# Patient Record
Sex: FEMALE | Race: WHITE | NOT HISPANIC OR LATINO | Employment: PART TIME | URBAN - METROPOLITAN AREA
[De-identification: names, ages, dates, MRNs, and addresses within clinical notes are randomized per-mention and may not be internally consistent; named-entity substitution may affect disease eponyms.]

---

## 2017-07-11 ENCOUNTER — OFFICE VISIT (OUTPATIENT)
Dept: FAMILY MEDICINE | Facility: CLINIC | Age: 32
End: 2017-07-11
Payer: COMMERCIAL

## 2017-07-11 VITALS
BODY MASS INDEX: 22.98 KG/M2 | WEIGHT: 143 LBS | OXYGEN SATURATION: 99 % | HEART RATE: 89 BPM | DIASTOLIC BLOOD PRESSURE: 81 MMHG | TEMPERATURE: 100.2 F | SYSTOLIC BLOOD PRESSURE: 133 MMHG | HEIGHT: 66 IN

## 2017-07-11 DIAGNOSIS — L23.7 CONTACT DERMATITIS DUE TO POISON IVY: Primary | ICD-10-CM

## 2017-07-11 PROCEDURE — 99213 OFFICE O/P EST LOW 20 MIN: CPT | Performed by: FAMILY MEDICINE

## 2017-07-11 RX ORDER — METHYLPREDNISOLONE 4 MG
TABLET, DOSE PACK ORAL
Qty: 21 TABLET | Refills: 0 | Status: SHIPPED | OUTPATIENT
Start: 2017-07-11 | End: 2017-07-26

## 2017-07-11 NOTE — PATIENT INSTRUCTIONS
[facility]    If you have any questions regarding to your visit please contact your care team:       Team Red:   Clinic Hours Telephone Number   Dr. Renetta Alonzo, NP   7am-7pm  Monday - Thursday   7am-5pm  Fridays  [phone]  (Appointment scheduling available 24/7)    Questions about your visit?   Team Line  [phone]   [facility] [place] [facility] 11am-9pm Monday-Friday Saturday-Sunday- 9am-5pm   [place] - 5pm-9pm Monday-Friday Saturday-Sunday- 9am-5pm  [phone] - [place]   [phone] - [place]       What options do I have for visits at the clinic other than the traditional office visit?  To expand how we care for you, many of our providers are utilizing electronic visits (e-visits) and telephone visits, when medically appropriate, for interactions with their patients rather than a visit in the clinic.   We also offer nurse visits for many medical concerns. Just like any other service, we will bill your insurance company for this type of visit based on time spent on the phone with your provider. Not all insurance companies cover these visits. Please check with your medical insurance if this type of visit is covered. You will be responsible for any charges that are not paid by your insurance.      E-visits via MyStar[employer]ograph:  generally incur a $35.00 fee.  Telephone visits:  Time spent on the phone: *charged based on time that is spent on the phone in increments of 10 minutes. Estimated cost:   5-10 mins $30.00   11-20 mins. $59.00   21-30 mins. $85.00     Use [employer] (secure email communication and access to your chart) to send your primary care provider a message or make an appointment. Ask someone on your Team how to sign up for MyStar[employer]ograph.  For a Price Quote for your services, please call our Consumer Price Line at [phone].      As always, Thank you for trusting us with your health care needs!    Avoiding  Poison Ivy, Poison Oak, and Poison Sumac  Poison oak, poison ivy, and poison sumac are plants that can cause skin rashes. The problem is urushiol, a sap oil contained in these plants. If you're allergic to urushiol, touching 1 of these plants may cause your skin to react. Within minutes or days, you may have a red, swollen, itchy rash. You can't stop the rash. But you can soothe the itching.        Recognizing these plants  You can help prevent a poison oak, poison ivy, or poison sumac rash. Know what these plants look like. And then avoid them. They grow in the form of belinda, small plants, and large bushes. In most cases, poison oak and poison ivy have 3 leaves per stem. Poison sumac has from 7 leaves to 13 leaves per stem. Watch out for these plants when you go to any outdoor area, from a friend's overgrown back yard to the [facility]. Urushiol is present in these plants all year round, even when the leaves are gone. So always be on the lookout.  What causes a reaction?  Poison oak, poison ivy, and poison sumac thrive mainly in unmaintained outdoor areas. If you touch these plants, you may get a rash. You may also react if you touch something that came in contact with urushiol, such as a dog or cat, clothing, or equipment. But the rash caused by these plants is not contagious.  Steps to prevention  When heading outdoors, take these preventive steps:    Avoid touching any of these plants.    Wear long pants and a long-sleeved shirt.    If you're going to a heavily wooded or brushy area, also put on gloves, a hat, and boots.    Apply bentoquatam 5% to all exposed areas of skin. This creates a layer of protection between your skin and any sap oil you may touch.    If you come in contact with these plants or the oil, wash with soap and water as soon as possible.  Date Last Reviewed: 5/11/2015 2000-2017 [employer]. [address] [place] [address], [place] [address]. All rights reserved. This information is not  intended as a substitute for professional medical care. Always follow your healthcare professional's instructions.        Managing a Poison Ivy, Poison Oak, or Poison Sumac Reaction  If you come in contact with urushiol    If you think you may have come in contact with the sap oil contained in these poison ivy, poison oak, and poison sumac plants (urishol), wash the affected part of your skin. Do this within 15 minutes after contact, using water or preferably, soap and water.  Undress, and wash your clothes and gear as soon as you can. Be sure to wash any pet that was with you. Taking these steps can help prevent spreading sap oil to someone else. If you have a rash, but are not sure if it is from one of these plants, consult with your health care provider.  To soothe the itching  Your skin may react to poison oak, poison ivy, and poison sumac within minutes to a few days after contact. Once you have come into contact with these plants, you can t stop the reaction. But you can take these steps to soothe the itching:    Don t scratch or scrub your rash, even if the itching is severe. Scratching can lead to infection.    Bathe in lukewarm (not hot) water or take short cool showers to relieve the itching. For a more soothing bath, add oatmeal to the water.    Use antihistamines that are taken by mouth (such as diphenhydramine). You can buy these at the drugstore. Talk to your health care provider or pharmacist for more information on oral antihistamines.    Use over-the-counter treatments on your skin, such as cortisone, compresses of Burow s solution, and calamine lotion.  How your skin may react  A mild rash may become red, swollen, and itchy. The rash may form a line on your skin where you brushed against the plant. If you have a severe rash, your itching may worsen. And your rash may blister and ooze. If this happens, seek medical care. The fluid from your blisters will not make your rash spread. With or without  medical care, your rash may last up to 3 weeks. In the future, try to avoid coming in contact with these plants.  When to call your health care provider  Call your health care provider if:    Your rash is severe    The rash spreads beyond the exposed part of your body or affects your face.    The rash does not clear up within a few weeks  You may be given medicine to take by mouth or apply directly on the skin. Go to the emergency room if you have any trouble breathing or swallowing or have any significant swelling.  Date Last Reviewed: 5/11/2015 2000-2017 [employer]. [address] [place] [address]. All rights reserved. This information is not intended as a substitute for professional medical care. Always follow your healthcare professional's instructions.      Discharged by Anastasia Dickinson MA.

## 2017-07-11 NOTE — MR AVS SNAPSHOT
After Visit Summary   7/11/2017    Joselyn Garcias    MRN: [id number]           Patient Information     Date Of Birth          1985        Visit Information        Provider Department      7/11/2017 12:50 PM Trinidad Jhaveri MD [facility]        Today's Diagnoses     Contact dermatitis due to poison ivy    -  1      Care Instructions    [facility]    If you have any questions regarding to your visit please contact your care team:       Team Red:   Clinic Hours Telephone Number   Dr. Renetta Alonzo NP   7am-7pm  Monday - Thursday   7am-5pm  Fridays  [phone]  (Appointment scheduling available 24/7)    Questions about your visit?   Team Line  [phone]   Urgent Care - [place] and [facility] - 11am-9pm Monday-Friday Saturday-Sunday- 9am-5pm   [place] - 5pm-9pm Monday-Friday Saturday-Sunday- 9am-5pm  [phone] - [facility]  [phone] - [place]       What options do I have for visits at the clinic other than the traditional office visit?  To expand how we care for you, many of our providers are utilizing electronic visits (e-visits) and telephone visits, when medically appropriate, for interactions with their patients rather than a visit in the clinic.   We also offer nurse visits for many medical concerns. Just like any other service, we will bill your insurance company for this type of visit based on time spent on the phone with your provider. Not all insurance companies cover these visits. Please check with your medical insurance if this type of visit is covered. You will be responsible for any charges that are not paid by your insurance.      E-visits via St[employer]Pool:  generally incur a $35.00 fee.  Telephone visits:  Time spent on the phone: *charged based on time that is spent on the phone in increments of 10 minutes. Estimated cost:   5-10 mins $30.00   11-20 mins. $59.00   21-30 mins. $85.00      Use [employer] (secure email communication and access to your chart) to send your primary care provider a message or make an appointment. Ask someone on your Team how to sign up for [employer].  For a Price Quote for your services, please call our [employer] Price Line at [phone].      As always, Thank you for trusting us with your health care needs!    Avoiding Poison Ivy, Poison Oak, and Poison Sumac  Poison oak, poison ivy, and poison sumac are plants that can cause skin rashes. The problem is urushiol, a sap oil contained in these plants. If you're allergic to urushiol, touching 1 of these plants may cause your skin to react. Within minutes or days, you may have a red, swollen, itchy rash. You can't stop the rash. But you can soothe the itching.        Recognizing these plants  You can help prevent a poison oak, poison ivy, or poison sumac rash. Know what these plants look like. And then avoid them. They grow in the form of belinda, small plants, and large bushes. In most cases, poison oak and poison ivy have 3 leaves per stem. Poison sumac has from 7 leaves to 13 leaves per stem. Watch out for these plants when you go to any outdoor area, from a friend's overgrown back yard to [facility]. Urushiol is present in these plants all year round, even when the leaves are gone. So always be on the lookout.  What causes a reaction?  Poison oak, poison ivy, and poison sumac thrive mainly in unmaintained outdoor areas. If you touch these plants, you may get a rash. You may also react if you touch something that came in contact with urushiol, such as a dog or cat, clothing, or equipment. But the rash caused by these plants is not contagious.  Steps to prevention  When heading outdoors, take these preventive steps:    Avoid touching any of these plants.    Wear long pants and a long-sleeved shirt.    If you're going to a heavily wooded or brushy area, also put on gloves, a hat, and boots.    Apply bentoquatam 5% to all  exposed areas of skin. This creates a layer of protection between your skin and any sap oil you may touch.    If you come in contact with these plants or the oil, wash with soap and water as soon as possible.  Date Last Reviewed: 5/11/2015 2000-2017 [employer]. [address] [place] [address]. All rights reserved. This information is not intended as a substitute for professional medical care. Always follow your healthcare professional's instructions.        Managing a Poison Ivy, Poison Oak, or Poison Sumac Reaction  If you come in contact with urushiol    If you think you may have come in contact with the sap oil contained in these poison ivy, poison oak, and poison sumac plants (urishol), wash the affected part of your skin. Do this within 15 minutes after contact, using water or preferably, soap and water.  Undress, and wash your clothes and gear as soon as you can. Be sure to wash any pet that was with you. Taking these steps can help prevent spreading sap oil to someone else. If you have a rash, but are not sure if it is from one of these plants, consult with your health care provider.  To soothe the itching  Your skin may react to poison oak, poison ivy, and poison sumac within minutes to a few days after contact. Once you have come into contact with these plants, you can t stop the reaction. But you can take these steps to soothe the itching:    Don t scratch or scrub your rash, even if the itching is severe. Scratching can lead to infection.    Bathe in lukewarm (not hot) water or take short cool showers to relieve the itching. For a more soothing bath, add oatmeal to the water.    Use antihistamines that are taken by mouth (such as diphenhydramine). You can buy these at the drugstore. Talk to your health care provider or pharmacist for more information on oral antihistamines.    Use over-the-counter treatments on your skin, such as cortisone, compresses of Burow s solution, and  calamine lotion.  How your skin may react  A mild rash may become red, swollen, and itchy. The rash may form a line on your skin where you brushed against the plant. If you have a severe rash, your itching may worsen. And your rash may blister and ooze. If this happens, seek medical care. The fluid from your blisters will not make your rash spread. With or without medical care, your rash may last up to 3 weeks. In the future, try to avoid coming in contact with these plants.  When to call your health care provider  Call your health care provider if:    Your rash is severe    The rash spreads beyond the exposed part of your body or affects your face.    The rash does not clear up within a few weeks  You may be given medicine to take by mouth or apply directly on the skin. Go to the emergency room if you have any trouble breathing or swallowing or have any significant swelling.  Date Last Reviewed: 5/11/2015 2000-2017 [employer]. [address] [place] [address]. All rights reserved. This information is not intended as a substitute for professional medical care. Always follow your healthcare professional's instructions.      Discharged by Anastasia Dickinson MA.            Follow-ups after your visit        Who to contact     If you have questions or need follow up information about today's clinic visit or your schedule please contact [facility] directly at [phone].  Normal or non-critical lab and imaging results will be communicated to you by MyChart, letter or phone within 4 business days after the clinic has received the results. If you do not hear from us within 7 days, please contact the clinic through MyChart or phone. If you have a critical or abnormal lab result, we will notify you by phone as soon as possible.  Submit refill requests through [employer] or call your pharmacy and they will forward the refill request to us. Please allow 3 business days for your refill to be  "completed.          Additional Information About Your Visit        [employer] Information     [facility] gives you secure access to your electronic health record. If you see a primary care provider, you can also send messages to your care team and make appointments. If you have questions, please call your primary care clinic.  If you do not have a primary care provider, please call [phone] and they will assist you.        Care EveryWhere ID     This is your Care EveryWhere ID. This could be used by other organizations to access your [place] medical records  [id number]        Your Vitals Were     Pulse Temperature Height Last Period Pulse Oximetry Breastfeeding?    89 100.2  F (37.9  C) (Oral) 5' 5.5\" (1.664 m) 06/19/2017 (Approximate) 99% No    BMI (Body Mass Index)                   23.43 kg/m2            Blood Pressure from Last 3 Encounters:   07/11/17 133/81   10/10/16 120/78   09/06/16 124/76    Weight from Last 3 Encounters:   07/11/17 143 lb (64.9 kg)   10/10/16 138 lb (62.6 kg)   09/06/16 135 lb 14.4 oz (61.6 kg)              Today, you had the following     No orders found for display         Today's Medication Changes          These changes are accurate as of: 7/11/17  1:13 PM.  If you have any questions, ask your nurse or doctor.               Start taking these medicines.        Dose/Directions    methylPREDNISolone 4 MG tablet   Commonly known as:  MEDROL DOSEPAK   Used for:  Contact dermatitis due to poison ivy   Started by:  Trinidad Jhaveri MD        Follow package instructions   Quantity:  21 tablet   Refills:  0            Where to get your medicines      These medications [id number] - [place] - [address] [facility] [address]  [address] [place] [address]     Phone:  [phone]     methylPREDNISolone 4 MG tablet                Primary Care Provider Office Phone # Fax #    ARVIND Lynn [phone] " [phone]       [facility] [address]  [facility] [address]        Equal Access to Services     ARVIN MERINO : Hadii ethan mondragon an Lechuga, waaxda luqadaha, qaybta kaalmada tona, mariusz howardbernie sherry. So [facility] [phone].    ATENCIÓN: Si habla español, tiene a reilly disposición servicios gratuitos de asistencia lingüística. Llame al [phone].    We comply with applicable federal civil rights laws and [place] laws. We do not discriminate on the basis of race, color, national origin, age, disability sex, sexual orientation or gender identity.            Thank you!     Thank you for choosing [facility]  for your care. Our goal is always to provide you with excellent care. Hearing back from our patients is one way we can continue to improve our services. Please take a few minutes to complete the written survey that you may receive in the mail after your visit with us. Thank you!             Your Updated Medication List - Protect others around you: Learn how to safely use, store and throw away your medicines at www.disposemymeds.org.          This list is accurate as of: 7/11/17  1:13 PM.  Always use your most recent med list.                   Brand Name Dispense Instructions for use Diagnosis    BENADRYL 25 MG tablet   Generic drug:  diphenhydrAMINE      Take 25 mg by mouth every 6 hours as needed.        desogestrel-ethinyl estradiol 0.15-30 MG-MCG per tablet    DESOGEN    90 tablet    Take 1 tablet by mouth daily    Encounter for initial prescription of contraceptive pills, Encounter for gynecological examination without abnormal finding       docusate sodium 100 MG tablet    COLACE    60 tablet    Take 100 mg by mouth daily    Encounter for gynecological examination without abnormal finding       methylPREDNISolone 4 MG tablet    MEDROL DOSEPAK    21 tablet    Follow package instructions    Contact dermatitis due to poison ivy       Multi-vitamin Tabs  tablet   Generic drug:  multivitamin, therapeutic with minerals      Take 1 tablet by mouth daily.

## 2017-07-11 NOTE — PROGRESS NOTES
SUBJECTIVE:                                                    Joselyn Garcias is a 31 year old female who presents to clinic today for the following health issues:        Rash  Onset: 6 days    Description:   Location: Left arm , right leg  Character: raised, draining, red  Itching (Pruritis): YES    Progression of Symptoms:  worsening    Accompanying Signs & Symptoms:  Fever: YES- low grade  Body aches or joint pain: no   Sore throat symptoms: no   Recent cold symptoms: no     History:   Previous similar rash: no     Precipitating factors:   Exposure to similar rash: no   New exposures: canoeing , gardening    Recent travel: YES- [place]    Alleviating factors:      Therapies Tried and outcome: cortisone , benadryl, baking soda, poison ivy spray         Problem list and histories reviewed & adjusted, as indicated.  Additional history: as documented    Patient Active Problem List   Diagnosis     CARDIOVASCULAR SCREENING; LDL GOAL LESS THAN 160     LSIL (low grade squamous intraepithelial lesion) on Pap smear     Past Surgical History:   Procedure Laterality Date     EYE SURGERY  2015    lasix       Social History   Substance Use Topics     Smoking status: Never Smoker     Smokeless tobacco: Never Used     Alcohol use Yes      Comment: 1 per wk     History reviewed. No pertinent family history.      Current Outpatient Prescriptions   Medication Sig Dispense Refill     methylPREDNISolone (MEDROL DOSEPAK) 4 MG tablet Follow package instructions 21 tablet 0     desogestrel-ethinyl estradiol (DESOGEN) 0.15-30 MG-MCG per tablet Take 1 tablet by mouth daily 90 tablet 3     Multiple Vitamin (MULTI-VITAMIN) per tablet Take 1 tablet by mouth daily.       docusate sodium (COLACE) 100 MG tablet Take 100 mg by mouth daily (Patient not taking: Reported on 7/11/2017) 60 tablet 3     diphenhydrAMINE (BENADRYL) 25 MG tablet Take 25 mg by mouth every 6 hours as needed.       Allergies   Allergen Reactions     Seasonal Allergies   "    Recent Labs   Lab Test  09/06/16   1411  07/15/14   1350  06/18/13   0933   04/07/11   1214   LDL  107*   --    --    --   110   HDL  66   --    --    --   60   TRIG  110   --    --    --   94   ALT  22  23  26   < >   --    CR  0.60  0.64  0.65   < >   --    GFRESTIMATED  >90  Non  GFR Calc    >90  >90   < >   --    GFRESTBLACK  >90   GFR Calc    >90  >90   < >   --    POTASSIUM  4.0  4.6  4.3   < >   --    TSH  2.80  2.16  3.82   < >   --     < > = values in this interval not displayed.      BP Readings from Last 3 Encounters:   07/11/17 133/81   10/10/16 120/78   09/06/16 124/76    Wt Readings from Last 3 Encounters:   07/11/17 143 lb (64.9 kg)   10/10/16 138 lb (62.6 kg)   09/06/16 135 lb 14.4 oz (61.6 kg)                  Labs reviewed in EPIC    Reviewed and updated as needed this visit by clinical staff  Tobacco  Allergies  Meds  Med Hx  Surg Hx  Fam Hx  Soc Hx      Reviewed and updated as needed this visit by Provider         ROS:  C: NEGATIVE for fever, chills, change in weight  E/M: NEGATIVE for ear, mouth and throat problems  R: NEGATIVE for significant cough or SOB  CV: NEGATIVE for chest pain, palpitations or peripheral edema  GI: NEGATIVE for nausea, abdominal pain, heartburn, or change in bowel habits    OBJECTIVE:     /81  Pulse 89  Temp 100.2  F (37.9  C) (Oral)  Ht 5' 5.5\" (1.664 m)  Wt 143 lb (64.9 kg)  LMP 06/19/2017 (Approximate)  SpO2 99%  Breastfeeding? No  BMI 23.43 kg/m2  Body mass index is 23.43 kg/(m^2).  GENERAL: healthy, alert and no distress  NECK: no adenopathy, no asymmetry, masses, or scars and thyroid normal to palpation  SKIN:   SKIN: Diffuse, scattered raised dermatitis in  with vesicles across legs, truck and arms.      Diagnostic Test Results:  none     ASSESSMENT/PLAN:     1. Contact dermatitis due to poison ivy    - methylPREDNISolone (MEDROL DOSEPAK) 4 MG tablet; Follow package instructions  Dispense: 21 tablet; Refill: " 0  Handout Given  Benadryl otc  Follow up 1 week if not better/sooner if worse      Trinidad Jhaveri MD  [facility]

## 2017-07-26 ENCOUNTER — OFFICE VISIT (OUTPATIENT)
Dept: FAMILY MEDICINE | Facility: CLINIC | Age: 32
End: 2017-07-26
Payer: COMMERCIAL

## 2017-07-26 VITALS
HEIGHT: 66 IN | WEIGHT: 140 LBS | TEMPERATURE: 99.8 F | DIASTOLIC BLOOD PRESSURE: 80 MMHG | BODY MASS INDEX: 22.5 KG/M2 | OXYGEN SATURATION: 98 % | HEART RATE: 121 BPM | RESPIRATION RATE: 16 BRPM | SYSTOLIC BLOOD PRESSURE: 122 MMHG

## 2017-07-26 DIAGNOSIS — R07.0 THROAT PAIN: Primary | ICD-10-CM

## 2017-07-26 LAB
DEPRECATED S PYO AG THROAT QL EIA: NORMAL
MICRO REPORT STATUS: NORMAL
SPECIMEN SOURCE: NORMAL

## 2017-07-26 PROCEDURE — 99213 OFFICE O/P EST LOW 20 MIN: CPT | Performed by: FAMILY MEDICINE

## 2017-07-26 PROCEDURE — 87880 STREP A ASSAY W/OPTIC: CPT | Performed by: FAMILY MEDICINE

## 2017-07-26 PROCEDURE — 87081 CULTURE SCREEN ONLY: CPT | Performed by: FAMILY MEDICINE

## 2017-07-26 NOTE — PROGRESS NOTES
"  SUBJECTIVE:                                                    Joselyn Garcias is a 31 year old female who presents to clinic today for the following health issues:    Acute Illness   Acute illness concerns: sore throat  Onset: 2 days    Fever: no     Chills/Sweats: no     Headache (location?): no     Sinus Pressure:no    Conjunctivitis:  no    Ear Pain: YES- right    Rhinorrhea: no    Congestion: no    Sore Throat: YES     Cough: no    Wheeze: no    Decreased Appetite: no    Nausea: no    Vomiting: no    Diarrhea:  no    Dysuria/Freq.: no    Fatigue/Achiness: no    Sick/Strep Exposure:nephew and nieces     Therapies Tried and outcome: nothing    ROS:  C: NEGATIVE for fever, chills, change in weight  I: NEGATIVE for worrisome rashes, moles or lesions  ENT/MOUTH: right sided sore throat and ear pain x 2 days   R: NEGATIVE for significant cough or SOB    OBJECTIVE:     /80  Pulse 121  Temp 99.8  F (37.7  C)  Resp 16  Ht 5' 6\" (1.676 m)  Wt 140 lb (63.5 kg)  LMP 06/19/2017 (Approximate)  SpO2 98%  BMI 22.6 kg/m2  Body mass index is 22.6 kg/(m^2).  GENERAL: healthy, alert and no distress  EYES: Eyes grossly normal to inspection, PERRL and conjunctivae and sclerae normal  HENT: normal cephalic/atraumatic, ear canals and TM's normal, nose and mouth without ulcers or lesions, oral mucous membranes moist and tonsillar hypertrophy and erythema bilaterally   NECK: no adenopathy, no asymmetry, masses, or scars and thyroid normal to palpation  RESP: lungs clear to auscultation - no rales, rhonchi or wheezes  CV: regular rate and rhythm, normal S1 S2, no S3 or S4, no murmur   MS: extremities normal- no gross deformities noted  PSYCH: mentation appears normal, affect normal/bright    Diagnostic Test Results:  Results for orders placed or performed in visit on 07/26/17   Strep, Rapid Screen   Result Value Ref Range    Specimen Description Throat     Rapid Strep A Screen       NEGATIVE: No Group A streptococcal " antigen detected by immunoassay, await   culture report.      Micro Report Status FINAL 07/26/2017        ASSESSMENT/PLAN:   (R07.0) Throat pain  (primary encounter diagnosis)  Comment: suspicious for early viral upper respiratory illness   Plan: Strep, Rapid Screen, Beta strep group A culture        Symptomatic care.  Return to clinic for persistence, recurrence or new symptoms.       See Patient Instructions    Renetta Knutson MD  [facility]

## 2017-07-26 NOTE — PATIENT INSTRUCTIONS
[facility]    If you have any questions regarding to your visit please contact your care team:       Team Red:   Clinic Hours Telephone Number   Dr. Renetta Alonzo, NP   7am-7pm  Monday - Thursday   7am-5pm  Fridays  [phone]  (Appointment scheduling available 24/7)    Questions about your visit?   Team Line  [phone]   Urgent Care - [place] - 11am-9pm Monday-Friday Saturday-Sunday- 9am-5pm   [place] - 5pm-9pm Monday-Friday Saturday-Sunday- 9am-5pm  [phone] - [place]   [phone] - [place]       What options do I have for visits at the clinic other than the traditional office visit?  To expand how we care for you, many of our providers are utilizing electronic visits (e-visits) and telephone visits, when medically appropriate, for interactions with their patients rather than a visit in the clinic.   We also offer nurse visits for many medical concerns. Just like any other service, we will bill your insurance company for this type of visit based on time spent on the phone with your provider. Not all insurance companies cover these visits. Please check with your medical insurance if this type of visit is covered. You will be responsible for any charges that are not paid by your insurance.      E-visits via [employer]:  generally incur a $35.00 fee.  Telephone visits:  Time spent on the phone: *charged based on time that is spent on the phone in increments of 10 minutes. Estimated cost:   5-10 mins $30.00   11-20 mins. $59.00   21-30 mins. $85.00     Use Nanoledget (secure email communication and access to your chart) to send your primary care provider a message or make an appointment. Ask someone on your Team how to sign up for [employer].  For a Price Quote for your services, please call our Consumer Price Line at [phone].      As always, Thank you for trusting us with your health care needs!  Valery ROSADO  MA

## 2017-07-26 NOTE — PROGRESS NOTES
Your results are normal.  Your final test results are pending.  Please check your chart again within 3 to 5 days. You will receive further instruction when your full test result panel is complete.    Renetta Knutson MD

## 2017-07-26 NOTE — NURSING NOTE
"Chief Complaint   Patient presents with     Pharyngitis     right side if throat     Otalgia     right ear       Initial /80  Pulse 121  Temp 99.8  F (37.7  C)  Resp 16  Ht 5' 6\" (1.676 m)  Wt 140 lb (63.5 kg)  LMP 06/19/2017 (Approximate)  SpO2 98%  BMI 22.6 kg/m2 Estimated body mass index is 22.6 kg/(m^2) as calculated from the following:    Height as of this encounter: 5' 6\" (1.676 m).    Weight as of this encounter: 140 lb (63.5 kg).  Medication Reconciliation: complete     Rajendra King. MA      "

## 2017-07-26 NOTE — MR AVS SNAPSHOT
After Visit Summary   7/26/2017    Joselyn Garcias    MRN: [id number]           Patient Information     Date Of Birth          1985        Visit Information        Provider Department      7/26/2017 10:40 AM Renetta Knutson MD [facility]        Today's Diagnoses     Throat pain    -  1      Care Instructions    [facility]    If you have any questions regarding to your visit please contact your care team:       Team Red:   Clinic Hours Telephone Number   Dr. Renetta Alonzo NP   7am-7pm  Monday - Thursday   7am-5pm  Fridays  [phone]  (Appointment scheduling available 24/7)    Questions about your visit?   Team Line  [phone]   Urgent Care - [place] [facility] - 11am-9pm Monday-Friday Saturday-Sunday- 9am-5pm   [place] - 5pm-9pm Monday-Friday Saturday-Sunday- 9am-5pm  [phone] - [place]   [phone] - [place]       What options do I have for visits at the clinic other than the traditional office visit?  To expand how we care for you, many of our providers are utilizing electronic visits (e-visits) and telephone visits, when medically appropriate, for interactions with their patients rather than a visit in the clinic.   We also offer nurse visits for many medical concerns. Just like any other service, we will bill your insurance company for this type of visit based on time spent on the phone with your provider. Not all insurance companies cover these visits. Please check with your medical insurance if this type of visit is covered. You will be responsible for any charges that are not paid by your insurance.      E-visits via [employer]:  generally incur a $35.00 fee.  Telephone visits:  Time spent on the phone: *charged based on time that is spent on the phone in increments of 10 minutes. Estimated cost:   5-10 mins $30.00   11-20 mins. $59.00   21-30 mins. $85.00     Use [employer]  "(secure email communication and access to your chart) to send your primary care provider a message or make an appointment. Ask someone on your Team how to sign up for [employer].  For a Price Quote for your services, please call our [employer] Line at [phone].      As always, Thank you for trusting us with your health care needs!  Valery ROSADO MA            Follow-ups after your visit        Follow-up notes from your care team     Return if symptoms worsen or fail to improve.      Who to contact     If you have questions or need follow up information about today's clinic visit or your schedule please contact [facility] directly at [phone].  Normal or non-critical lab and imaging results will be communicated to you by Ensahart, letter or phone within 4 business days after the clinic has received the results. If you do not hear from us within 7 days, please contact the clinic through Ensahart or phone. If you have a critical or abnormal lab result, we will notify you by phone as soon as possible.  Submit refill requests through [employer] or call your pharmacy and they will forward the refill request to us. Please allow 3 business days for your refill to be completed.          Additional Information About Your Visit        Ensahart Information     [employer] gives you secure access to your electronic health record. If you see a primary care provider, you can also send messages to your care team and make appointments. If you have questions, please call your primary care clinic.  If you do not have a primary care provider, please call [phone] and they will assist you.        Care EveryWhere ID     This is your Care EveryWhere ID. This could be used by other organizations to access your [place] medical records  [id number]        Your Vitals Were     Pulse Temperature Respirations Height Last Period Pulse Oximetry    121 99.8  F (37.7  C) 16 5' 6\" (1.676 m) 06/19/2017 (Approximate) 98%    BMI (Body " Mass Index)                   22.6 kg/m2            Blood Pressure from Last 3 Encounters:   07/26/17 122/80   07/11/17 133/81   10/10/16 120/78    Weight from Last 3 Encounters:   07/26/17 140 lb (63.5 kg)   07/11/17 143 lb (64.9 kg)   10/10/16 138 lb (62.6 kg)              We Performed the Following     Beta strep group A culture     Strep, Rapid Screen        Primary Care Provider Office Phone # Fax #    Trinidad Jhaveri [phone]       [facility] [address] [place] [address]        Equal Access to Services     [facility] : Hadii ethan mondragon hadtawandao Solaura, waaxda luqadaha, qaybta kaalmada adereina, mariusz powell. So [facility] [phone].    ATENCIÓN: Si habla español, tiene a reilly disposición servicios gratuitos de asistencia lingüística. [facility] [phone].    We comply with applicable federal civil rights laws and [place] laws. We do not discriminate on the basis of race, color, national origin, age, disability sex, sexual orientation or gender identity.            Thank you!     Thank you for choosing [facility]  for your care. Our goal is always to provide you with excellent care. Hearing back from our patients is one way we can continue to improve our services. Please take a few minutes to complete the written survey that you may receive in the mail after your visit with us. Thank you!             Your Updated Medication List - Protect others around you: Learn how to safely use, store and throw away your medicines at www.disposemymeds.org.          This list is accurate as of: 7/26/17 11:23 AM.  Always use your most recent med list.                   Brand Name Dispense Instructions for use Diagnosis    BENADRYL 25 MG tablet   Generic drug:  diphenhydrAMINE      Take 25 mg by mouth every 6 hours as needed.        desogestrel-ethinyl estradiol 0.15-30 MG-MCG per tablet    DESOGEN    90 tablet    Take 1 tablet by mouth daily     Encounter for initial prescription of contraceptive pills, Encounter for gynecological examination without abnormal finding       docusate sodium 100 MG tablet    COLACE    60 tablet    Take 100 mg by mouth daily    Encounter for gynecological examination without abnormal finding       Multi-vitamin Tabs tablet   Generic drug:  multivitamin, therapeutic with minerals      Take 1 tablet by mouth daily.

## 2017-07-27 LAB
BACTERIA SPEC CULT: NORMAL
MICRO REPORT STATUS: NORMAL
SPECIMEN SOURCE: NORMAL

## 2017-08-14 DIAGNOSIS — Z01.419 ENCOUNTER FOR GYNECOLOGICAL EXAMINATION WITHOUT ABNORMAL FINDING: ICD-10-CM

## 2017-08-14 DIAGNOSIS — Z30.011 ENCOUNTER FOR INITIAL PRESCRIPTION OF CONTRACEPTIVE PILLS: ICD-10-CM

## 2017-08-14 RX ORDER — DESOGESTREL AND ETHINYL ESTRADIOL 0.15-0.03
1 KIT ORAL DAILY
Qty: 90 TABLET | Refills: 3 | Status: CANCELLED | OUTPATIENT
Start: 2017-08-14

## 2017-08-14 NOTE — TELEPHONE ENCOUNTER
Reclipsen      Last Written Prescription Date: 07/15/17  Last Fill Quantity: 28, # refills: 3  Last Office Visit with [facility], [facility] or [facility] prescribing provider: 09/06/16       BP Readings from Last 3 Encounters:   07/26/17 122/80   07/11/17 133/81   10/10/16 120/78     Date of last Breast Exam: ?

## 2017-08-15 ENCOUNTER — MYC REFILL (OUTPATIENT)
Dept: OBGYN | Facility: CLINIC | Age: 32
End: 2017-08-15

## 2017-08-15 DIAGNOSIS — Z30.011 ENCOUNTER FOR INITIAL PRESCRIPTION OF CONTRACEPTIVE PILLS: ICD-10-CM

## 2017-08-15 DIAGNOSIS — Z01.419 ENCOUNTER FOR GYNECOLOGICAL EXAMINATION WITHOUT ABNORMAL FINDING: ICD-10-CM

## 2017-08-15 RX ORDER — DESOGESTREL AND ETHINYL ESTRADIOL 0.15-0.03
1 KIT ORAL DAILY
Qty: 30 TABLET | Refills: 0 | Status: SHIPPED | OUTPATIENT
Start: 2017-08-15 | End: 2017-09-07

## 2017-08-15 NOTE — TELEPHONE ENCOUNTER
Message from [employer]:  Original authorizing provider: Cephas Mawuena Agbeh, MD [facility] would like a refill of the following medications:  desogestrel-ethinyl estradiol (DESOGEN) 0.15-30 MG-MCG per tablet [Cephas Mawuena Agbeh, MD]    Preferred pharmacy: [facility] [address] [place], [place] [address] [facility] & [place]    Comment:  I believe the pharmacy messed up my refill count. I was in recently for a different matter and the  said I should have one more refill and that I should make my yearly appointment the end of August. I will be out of pills Saturday and will need a refill. I will also make that appointment. Thank you.

## 2017-08-15 NOTE — TELEPHONE ENCOUNTER
desogestrel-ethinyl estradiol (DESOGEN) 0.15-30 MG-MCG per tablet 90 tablet 3 9/6/2016  No   Sig: Take 1 tablet by mouth daily   Class: E-Prescribe   Last Office Visit with [facility], [facility] or [facility] prescribing provider: 09-06-16 for [facility] with Dr. Agbeh.    Prescription approved per [facility] Refill Protocol.  Carol Galicia RN, BAN

## 2017-08-15 NOTE — TELEPHONE ENCOUNTER
desogestrel-ethinyl estradiol (DESOGEN) 0.15-30 MG-MCG per tablet 30 tablet 0 8/15/2017  No   Sig: Take 1 tablet by mouth daily Patient needs to be seen prior to further refills       Duplicate request. Carol Galicia RN, BAN

## 2017-08-30 ENCOUNTER — TELEPHONE (OUTPATIENT)
Dept: OBGYN | Facility: CLINIC | Age: 32
End: 2017-08-30

## 2017-08-30 NOTE — TELEPHONE ENCOUNTER
Reason for Call:  Other call back    Detailed comments: Patient has questions regarding scheduling a appointment for birthcontrol / around the time of her menstrual cycle please call to discuss further    Phone Number Patient can be reached at: Home number on file [phone] (home) or Work number on file:  none (work)    Best Time: today,     Can we leave a detailed message on this number? YES    Call taken on 8/30/2017 at 10:30 AM by Alberto Delarosa

## 2017-08-30 NOTE — TELEPHONE ENCOUNTER
I called patient to help schedule. She stated she already made appointment.  I will close chart  FADUMO Kaminski 8/30/2017

## 2017-09-07 ENCOUNTER — OFFICE VISIT (OUTPATIENT)
Dept: OBGYN | Facility: CLINIC | Age: 32
End: 2017-09-07
Payer: COMMERCIAL

## 2017-09-07 VITALS
WEIGHT: 138 LBS | OXYGEN SATURATION: 100 % | HEIGHT: 66 IN | HEART RATE: 78 BPM | DIASTOLIC BLOOD PRESSURE: 71 MMHG | BODY MASS INDEX: 22.18 KG/M2 | SYSTOLIC BLOOD PRESSURE: 112 MMHG

## 2017-09-07 DIAGNOSIS — Z30.011 ENCOUNTER FOR INITIAL PRESCRIPTION OF CONTRACEPTIVE PILLS: ICD-10-CM

## 2017-09-07 DIAGNOSIS — Z01.419 ENCOUNTER FOR GYNECOLOGICAL EXAMINATION WITHOUT ABNORMAL FINDING: Primary | ICD-10-CM

## 2017-09-07 PROCEDURE — 99395 PREV VISIT EST AGE 18-39: CPT | Performed by: OBSTETRICS & GYNECOLOGY

## 2017-09-07 RX ORDER — DESOGESTREL AND ETHINYL ESTRADIOL 0.15-0.03
1 KIT ORAL DAILY
Qty: 84 TABLET | Refills: 4 | Status: SHIPPED | OUTPATIENT
Start: 2017-09-07 | End: 2019-09-19

## 2017-09-07 NOTE — MR AVS SNAPSHOT
After Visit Summary   9/7/2017    Joselyn Garcias    MRN: [id number]           Patient Information     Date Of Birth          1985        Visit Information        Provider Department      9/7/2017 2:30 PM Zak Last MD [facility]        Today's Diagnoses     Encounter for gynecological examination without abnormal finding    -  1    Encounter for initial prescription of contraceptive pills           Follow-ups after your visit        Follow-up notes from your care team     Return in about 1 year (around 9/7/2018) for Physical Exam.      Who to contact     If you have questions or need follow up information about today's clinic visit or your schedule please contact [facility] directly at [phone].  Normal or non-critical lab and imaging results will be communicated to you by MyChart, letter or phone within 4 business days after the clinic has received the results. If you do not hear from us within 7 days, please contact the clinic through Garmorhart or phone. If you have a critical or abnormal lab result, we will notify you by phone as soon as possible.  Submit refill requests through Tavern or call your pharmacy and they will forward the refill request to us. Please allow 3 business days for your refill to be completed.          Additional Information About Your Visit        MyChart Information     Tavern gives you secure access to your electronic health record. If you see a primary care provider, you can also send messages to your care team and make appointments. If you have questions, please call your primary care clinic.  If you do not have a primary care provider, please call [phone] and they will assist you.        Care EveryWhere ID     This is your Care EveryWhere ID. This could be used by other organizations to access your [place] medical records  [id number]        Your Vitals Were     Pulse Height Last Period Pulse Oximetry  "Breastfeeding? BMI (Body Mass Index)    78 5' 5.6\" (1.666 m) 08/14/2017 100% No 22.55 kg/m2       Blood Pressure from Last 3 Encounters:   09/07/17 112/71   07/26/17 122/80   07/11/17 133/81    Weight from Last 3 Encounters:   09/07/17 138 lb (62.6 kg)   07/26/17 140 lb (63.5 kg)   07/11/17 143 lb (64.9 kg)              Today, you had the following     No orders found for display         Where to get your medicines      These medications were sent to [id number] - [place] - [address] AT [facility] & [place]  [address] [facility] [address]     Phone:  [phone]     desogestrel-ethinyl estradiol 0.15-30 MG-MCG per tablet          Primary Care Provider Office Phone # Fax #    Trinidad Jhaveri [phone] [address]  [place] [address]        Equal Access to Services     [facility]: Hadii ethan mondragon hadjaneth Solaura, waaxda luarben, qaybta kaalarmani carbone, mariusz powell. So [facility] [phone].    ATENCIÓN: Si habla español, tiene a reilly disposición servicios gratuitos de asistencia lingüística. [facility] [phone].    We comply with applicable federal civil rights laws and [place] laws. We do not discriminate on the basis of race, color, national origin, age, disability sex, sexual orientation or gender identity.            Thank you!     Thank you for choosing [facility]  for your care. Our goal is always to provide you with excellent care. Hearing back from our patients is one way we can continue to improve our services. Please take a few minutes to complete the written survey that you may receive in the mail after your visit with us. Thank you!             Your Updated Medication List - Protect others around you: Learn how to safely use, store and throw away your medicines at www.disposemymeds.org.          This list is accurate as of: 9/7/17  3:19 PM.  Always use your most recent " med list.                   Brand Name Dispense Instructions for use Diagnosis    BENADRYL 25 MG tablet   Generic drug:  diphenhydrAMINE      Take 25 mg by mouth every 6 hours as needed.        desogestrel-ethinyl estradiol 0.15-30 MG-MCG per tablet    DESOGEN    84 tablet    Take 1 tablet by mouth daily Patient needs to be seen prior to further refills.    Encounter for initial prescription of contraceptive pills, Encounter for gynecological examination without abnormal finding       Multi-vitamin Tabs tablet   Generic drug:  multivitamin, therapeutic with minerals      Take 1 tablet by mouth daily.

## 2017-09-07 NOTE — PROGRESS NOTES
Joselyn Garcias is a 31 year old female , who presents for annual exam.   No unusual bleeding, no incontinence, or unusual discharge.   She is currently using Desogen for contraception.  She does not have any apparent contraindications to use.  She has not had any apparent complications with it's use.  Last cholesterol:   Recent Labs   Lab Test  16   1411  11   1214   CHOL  195  189   HDL  66  60   LDL  107*  110   TRIG  110  94   CHOLHDLRATIO   --   3.1     Past Medical History:   Diagnosis Date     ASCUS on Pap smear 11     Cervical high risk HPV (human papillomavirus) test positive 11    HPV 16     History of colposcopy with cervical biopsy 11    ISMAEL 1     LSIL (low grade squamous intraepithelial lesion) on Pap smear 11     Poison ivy dermatitis        Past Surgical History:   Procedure Laterality Date     EYE SURGERY      lasix       Obstetric History       T0      L0     SAB0   TAB0   Ectopic0   Multiple0   Live Births0           Gyn History:  Gynecological History         Patient's last menstrual period was 2017.     STD:  HPV/no PID/no IUD      history of abnormal pap smear:  yes  Last pap:   Lab Results   Component Value Date    PAP NIL 2016           Current Outpatient Prescriptions   Medication Sig Dispense Refill     desogestrel-ethinyl estradiol (DESOGEN) 0.15-30 MG-MCG per tablet Take 1 tablet by mouth daily Patient needs to be seen prior to further refills. 30 tablet 0     diphenhydrAMINE (BENADRYL) 25 MG tablet Take 25 mg by mouth every 6 hours as needed.       Multiple Vitamin (MULTI-VITAMIN) per tablet Take 1 tablet by mouth daily.         Allergies   Allergen Reactions     Seasonal Allergies        Social History     Social History     Marital status: Single     Spouse name: N/A     Number of children: N/A     Years of education: N/A     Occupational History      [employer]     Social History Main Topics     Smoking  "status: Never Smoker     Smokeless tobacco: Never Used     Alcohol use Yes      Comment: 1 per wk     Drug use: No     Sexual activity: Yes     Partners: Male     Birth control/ protection: Pill, Condom     Other Topics Concern     Parent/Sibling W/ Cabg, Mi Or Angioplasty Before 65f 55m? No      Service No     Blood Transfusions No     Caffeine Concern No     Occupational Exposure No     Hobby Hazards No     Sleep Concern No     Stress Concern No     Weight Concern No     Special Diet No     Back Care No     Exercise Yes     occ     Bike Helmet Not Asked     doesn't ride bike outside     Seat Belt Yes     Self-Exams No     Social History Narrative       No family history on file.      ROS:  All negative except as above.      EXAM:  /71 (BP Location: Right arm, Cuff Size: Adult Regular)  Pulse 78  Ht 1.666 m (5' 5.6\")  Wt 62.6 kg (138 lb)  LMP 08/14/2017  SpO2 100%  Breastfeeding? No  BMI 22.55 kg/m2  General:  WNWD female, NAD  Alert  Oriented x 3  Gait:  Normal  Skin:  Normal skin turgor  Neurologic:  CN grossly intact, good sensation.    HEENT:  NC/AT, EOMI  Neck:  No masses palpated, symmetrical, carotids +2/4, no bruits heard  Heart:  RRR  Lungs:  Clear   Breasts:  Symmetrical, no dimpling noted, no masses palpated, no discharge expressed  Abdomen:  Non-tender, non-distended.  Vulva: No external lesions, normal hair distribution, no adenopathy  BUS:  Normal, no masses noted  Urethra:  No hypermobility noted  Urethral meatus:  No masses noted  Vagina: Moist, pink, no abnormal discharge, well rugated, no lesions  Cervix: Smooth, pink, no visible lesions  Uterus: Normal size, anteverted, non-tender, mobile  Ovaries: No mass, non-tender, mobile  Perianal:  No masses noted.    Extremities:  No clubbing, cyanosis, or edema      ASSESSMENT/PLAN   Annual examination   OCP surveillance.  Low fat diet, weight bearing exercises and self breast exams on a monthly basis have been recommended.  I have " discussed with patient the risks, benefits, medications, treatment options and modalities.   I have instructed the patient to call or schedule a follow-up appointment if any problems.

## 2018-01-29 ENCOUNTER — TELEPHONE (OUTPATIENT)
Dept: FAMILY MEDICINE | Facility: CLINIC | Age: 33
End: 2018-01-29

## 2018-01-29 NOTE — TELEPHONE ENCOUNTER
Reason for Call:  Other appointment    Detailed comments:  Patient calling. She has a cough x 2 weeks. Cold. Please call to advise.     Phone Number Patient can be reached at: Cell number on file:    Telephone Information:   Mobile [phone]       Best Time:  Any     Can we leave a detailed message on this number? YES    Call taken on 1/29/2018 at 3:03 PM by Citlali Bello

## 2018-01-30 ENCOUNTER — OFFICE VISIT (OUTPATIENT)
Dept: FAMILY MEDICINE | Facility: CLINIC | Age: 33
End: 2018-01-30
Payer: COMMERCIAL

## 2018-01-30 VITALS
SYSTOLIC BLOOD PRESSURE: 118 MMHG | TEMPERATURE: 98.2 F | WEIGHT: 140 LBS | HEART RATE: 80 BPM | OXYGEN SATURATION: 100 % | DIASTOLIC BLOOD PRESSURE: 70 MMHG | BODY MASS INDEX: 21.97 KG/M2 | RESPIRATION RATE: 20 BRPM | HEIGHT: 67 IN

## 2018-01-30 DIAGNOSIS — J06.9 UPPER RESPIRATORY TRACT INFECTION, UNSPECIFIED TYPE: Primary | ICD-10-CM

## 2018-01-30 PROCEDURE — 99213 OFFICE O/P EST LOW 20 MIN: CPT | Performed by: FAMILY MEDICINE

## 2018-01-30 NOTE — MR AVS SNAPSHOT
After Visit Summary   1/30/2018    Joselyn Flynn    MRN: [id number]           Patient Information     Date Of Birth          1985        Visit Information        Provider Department      1/30/2018 6:20 PM Trinidad Jhaveri MD [facility] [place]        Care Instructions    Upper respiratory infections are usually caused by viruses and, sometimes certain bacteria.  Antibiotics don't help the vast majority of people recover any quicker even when caused by a bacteria.  The body will fight this infection but it needs to be treated well in order to help heal itself.  Rest as needed.  It is ok to reduce food intake if appetite is poor but it is quite important to maintain/increase fluid intake.    For cough, dextromethorphan/guaifenesin combinations help loosen secretions and suppress cough safely without significant risk of sedation. Often 2 puffs four times daily of an albuterol inhaler will help with bronchitis.  This is a prescription medicine.    For nasal congestion and sinus pressure, pseudoephedrine (Sudafed) or phenylephrine is often helpful but it can cause elevations in blood pressure and insomnia.  Short courses of a nasal decongestant spray (Afrin or Neosinephrine) can be appropriate but their use should be restricted to 3 days due to the high risk of nasal addiction.    For pain and fevers, acetaminophen (Tylenol) is most appropriate.  Ibuprofen (Advil) or naproxen (Aleve) are useful too and last longer but they can cause elevation of blood pressure or stomach problems.    Antihistamines (Benadryl, Dimetapp, etc.) cause sedation, confusion, bowel and urinary abnormalities and are of little use for infectious causes of cough and nasal congestion.  Their use should be reserved for allergic symptoms.            Follow-ups after your visit        Who to contact     If you have questions or need follow up information about today's clinic visit or your schedule please contact [place]  [facility] directly at [phone].  Normal or non-critical lab and imaging results will be communicated to you by MyChart, letter or phone within 4 business days after the clinic has received the results. If you do not hear from us within 7 days, please contact the clinic through [email or web address][email or web address] or phone. If you have a critical or abnormal lab result, we will notify you by phone as soon as possible.  Submit refill requests through [employer] or call your pharmacy and they will forward the refill request to us. Please allow 3 business days for your refill to be completed.          Additional Information About Your Visit        [email or web address] Information     [employer] gives you secure access to your electronic health record. If you see a primary care provider, you can also send messages to your care team and make appointments. If you have questions, please call your primary care clinic.  If you do not have a primary care provider, please call [phone] and they will assist you.        Care EveryWhere ID     This is your Care EveryWhere ID. This could be used by other organizations to access your [place] medical records  [id number]        Your Vitals Were     Pulse Temperature Respirations Height Last Period Pulse Oximetry    80 98.2  F (36.8  C) (Oral) 20 5' 6.5\" (1.689 m) 01/30/2018 100%    Breastfeeding? BMI (Body Mass Index)                No 22.26 kg/m2           Blood Pressure from Last 3 Encounters:   01/30/18 118/70   09/07/17 112/71   07/26/17 122/80    Weight from Last 3 Encounters:   01/30/18 140 lb (63.5 kg)   09/07/17 138 lb (62.6 kg)   07/26/17 140 lb (63.5 kg)              Today, you had the following     No orders found for display       Primary Care Provider Office Phone # Fax #    Trinidad Jhaveri [phone] [address] [facility] [place] [address]        Equal Access to Services     ARVIN MERINO AH: Hadii ethan Lechuga, waaxda luqadaha, qaybta kaalmariusz bagley " micahjulesbernie rviersquentin jennahaja lacarltonbernie ah. So [facility] [phone].    ATENCIÓN: Si earnestla melinda, tiene a reilly disposición servicios gratuitos de asistencia lingüística. Sergio al [phone].    We comply with applicable federal civil rights laws and [place] laws. We do not discriminate on the basis of race, color, national origin, age, disability, sex, sexual orientation, or gender identity.            Thank you!     Thank you for choosing [facility]  for your care. Our goal is always to provide you with excellent care. Hearing back from our patients is one way we can continue to improve our services. Please take a few minutes to complete the written survey that you may receive in the mail after your visit with us. Thank you!             Your Updated Medication List - Protect others around you: Learn how to safely use, store and throw away your medicines at www.disposemymeds.org.          This list is accurate as of 1/30/18  6:39 PM.  Always use your most recent med list.                   Brand Name Dispense Instructions for use Diagnosis    BENADRYL 25 MG tablet   Generic drug:  diphenhydrAMINE      Take 25 mg by mouth every 6 hours as needed.        desogestrel-ethinyl estradiol 0.15-30 MG-MCG per tablet    DESOGEN    84 tablet    Take 1 tablet by mouth daily Patient needs to be seen prior to further refills.    Encounter for initial prescription of contraceptive pills, Encounter for gynecological examination without abnormal finding       Multi-vitamin Tabs tablet   Generic drug:  multivitamin, therapeutic with minerals      Take 1 tablet by mouth daily.

## 2018-01-31 NOTE — PATIENT INSTRUCTIONS
Upper respiratory infections are usually caused by viruses and, sometimes certain bacteria.  Antibiotics don't help the vast majority of people recover any quicker even when caused by a bacteria.  The body will fight this infection but it needs to be treated well in order to help heal itself.  Rest as needed.  It is ok to reduce food intake if appetite is poor but it is quite important to maintain/increase fluid intake.    For cough, dextromethorphan/guaifenesin combinations help loosen secretions and suppress cough safely without significant risk of sedation. Often 2 puffs four times daily of an albuterol inhaler will help with bronchitis.  This is a prescription medicine.    For nasal congestion and sinus pressure, pseudoephedrine (Sudafed) or phenylephrine is often helpful but it can cause elevations in blood pressure and insomnia.  Short courses of a nasal decongestant spray (Afrin or Neosinephrine) can be appropriate but their use should be restricted to 3 days due to the high risk of nasal addiction.    For pain and fevers, acetaminophen (Tylenol) is most appropriate.  Ibuprofen (Advil) or naproxen (Aleve) are useful too and last longer but they can cause elevation of blood pressure or stomach problems.    Antihistamines (Benadryl, Dimetapp, etc.) cause sedation, confusion, bowel and urinary abnormalities and are of little use for infectious causes of cough and nasal congestion.  Their use should be reserved for allergic symptoms.

## 2018-01-31 NOTE — NURSING NOTE
"Chief Complaint   Patient presents with     URI       Initial /90  Pulse 80  Temp 98.2  F (36.8  C) (Oral)  Resp 20  Ht 5' 6.5\" (1.689 m)  Wt 140 lb (63.5 kg)  LMP 01/30/2018  SpO2 100%  Breastfeeding? No  BMI 22.26 kg/m2 Estimated body mass index is 22.26 kg/(m^2) as calculated from the following:    Height as of this encounter: 5' 6.5\" (1.689 m).    Weight as of this encounter: 140 lb (63.5 kg).  Medication Reconciliation: complete.  Valery ROSADO MA      "

## 2018-01-31 NOTE — PROGRESS NOTES
SUBJECTIVE:   Joselyn Flynn is a 32 year old female who presents to clinic today for the following health issues:        Acute Illness   Acute illness concerns: cough  Onset: 3 weeks off and on-is getting better    Fever: no    Chills/Sweats: no    Headache (location?): no    Sinus Pressure:no    Conjunctivitis:  no    Ear Pain: no    Rhinorrhea: YES    Congestion: no    Sore Throat: no     Cough: YES-productive of yellow sputum    Wheeze: no    Decreased Appetite: no    Nausea: no    Vomiting: no    Diarrhea:  no    Dysuria/Freq.: no    Fatigue/Achiness: no    Sick/Strep Exposure: no     Therapies Tried and outcome: Nyquil , cough drops          Problem list and histories reviewed & adjusted, as indicated.  Additional history: as documented    Patient Active Problem List   Diagnosis     CARDIOVASCULAR SCREENING; LDL GOAL LESS THAN 160     LSIL (low grade squamous intraepithelial lesion) on Pap smear     Past Surgical History:   Procedure Laterality Date     EYE SURGERY  2015    lasix       Social History   Substance Use Topics     Smoking status: Never Smoker     Smokeless tobacco: Never Used     Alcohol use Yes      Comment: 1 per wk     History reviewed. No pertinent family history.      Current Outpatient Prescriptions   Medication Sig Dispense Refill     desogestrel-ethinyl estradiol (DESOGEN) 0.15-30 MG-MCG per tablet Take 1 tablet by mouth daily Patient needs to be seen prior to further refills. 84 tablet 4     diphenhydrAMINE (BENADRYL) 25 MG tablet Take 25 mg by mouth every 6 hours as needed.       Multiple Vitamin (MULTI-VITAMIN) per tablet Take 1 tablet by mouth daily.       Allergies   Allergen Reactions     Seasonal Allergies      Recent Labs   Lab Test  09/06/16   1411  07/15/14   1350  06/18/13   0933   04/07/11   1214   LDL  107*   --    --    --   110   HDL  66   --    --    --   60   TRIG  110   --    --    --   94   ALT  22  23  26   < >   --    CR  0.60  0.64  0.65   < >   --   "  GFRESTIMATED  >90  Non  GFR Calc    >90  >90   < >   --    GFRESTBLACK  >90   GFR Calc    >90  >90   < >   --    POTASSIUM  4.0  4.6  4.3   < >   --    TSH  2.80  2.16  3.82   < >   --     < > = values in this interval not displayed.      BP Readings from Last 3 Encounters:   01/30/18 118/70   09/07/17 112/71   07/26/17 122/80    Wt Readings from Last 3 Encounters:   01/30/18 140 lb (63.5 kg)   09/07/17 138 lb (62.6 kg)   07/26/17 140 lb (63.5 kg)                  Labs reviewed in EPIC    Reviewed and updated as needed this visit by clinical staff  Tobacco  Allergies  Meds  Med Hx  Surg Hx  Fam Hx  Soc Hx      Reviewed and updated as needed this visit by Provider         ROS:  C: NEGATIVE for fever, chills, change in weight  INTEGUMENTARY/SKIN: NEGATIVE for worrisome rashes, moles or lesions  ENT/MOUTH: nasal congestion  R: NEGATIVE for significant cough or SOB  CV: NEGATIVE for chest pain, palpitations or peripheral edema  GI: NEGATIVE for nausea, abdominal pain, heartburn, or change in bowel habits  MUSCULOSKELETAL: NEGATIVE for significant arthralgias or myalgia  PSYCHIATRIC: NEGATIVE for changes in mood or affect    OBJECTIVE:     /70  Pulse 80  Temp 98.2  F (36.8  C) (Oral)  Resp 20  Ht 5' 6.5\" (1.689 m)  Wt 140 lb (63.5 kg)  LMP 01/30/2018  SpO2 100%  Breastfeeding? No  BMI 22.26 kg/m2  Body mass index is 22.26 kg/(m^2).  GENERAL: healthy, alert and no distress  EYES: Eyes grossly normal to inspection, PERRL and conjunctivae and sclerae normal  HENT: ear canals and TM's normal, nose and mouth without ulcers or lesions  NECK: no adenopathy, no asymmetry, masses, or scars and thyroid normal to palpation  RESP: lungs clear to auscultation - no rales, rhonchi or wheezes  CV: regular rate and rhythm, normal S1 S2, no S3 or S4, no murmur, click or rub, no peripheral edema and peripheral pulses strong  ABDOMEN: soft, nontender, no hepatosplenomegaly, no masses " and bowel sounds normal  MS: no gross musculoskeletal defects noted, no edema    Diagnostic Test Results:  none     ASSESSMENT/PLAN:       1. Upper respiratory tract infection, unspecified type  Advised symptomatic Treatment  Rest/fluids  Follow up 1 week if not better/sooner if worse      Trinidad Jhaveri MD  [facility]

## 2018-02-09 ENCOUNTER — RADIANT APPOINTMENT (OUTPATIENT)
Dept: GENERAL RADIOLOGY | Facility: CLINIC | Age: 33
End: 2018-02-09
Attending: PHYSICIAN ASSISTANT
Payer: COMMERCIAL

## 2018-02-09 ENCOUNTER — OFFICE VISIT (OUTPATIENT)
Dept: URGENT CARE | Facility: URGENT CARE | Age: 33
End: 2018-02-09
Payer: COMMERCIAL

## 2018-02-09 VITALS
DIASTOLIC BLOOD PRESSURE: 81 MMHG | HEART RATE: 104 BPM | OXYGEN SATURATION: 100 % | SYSTOLIC BLOOD PRESSURE: 128 MMHG | TEMPERATURE: 99.4 F | WEIGHT: 135 LBS | BODY MASS INDEX: 21.46 KG/M2

## 2018-02-09 DIAGNOSIS — R00.2 INTERMITTENT PALPITATIONS: ICD-10-CM

## 2018-02-09 DIAGNOSIS — J06.9 UPPER RESPIRATORY INFECTION WITH COUGH AND CONGESTION: Primary | ICD-10-CM

## 2018-02-09 DIAGNOSIS — R05.9 COUGH: ICD-10-CM

## 2018-02-09 LAB
D DIMER PPP FEU-MCNC: 0.3 UG/ML FEU (ref 0–0.5)
FLUAV+FLUBV AG SPEC QL: NEGATIVE
FLUAV+FLUBV AG SPEC QL: NEGATIVE
SPECIMEN SOURCE: NORMAL

## 2018-02-09 PROCEDURE — 71046 X-RAY EXAM CHEST 2 VIEWS: CPT | Mod: FY

## 2018-02-09 PROCEDURE — 36415 COLL VENOUS BLD VENIPUNCTURE: CPT | Performed by: PHYSICIAN ASSISTANT

## 2018-02-09 PROCEDURE — 87804 INFLUENZA ASSAY W/OPTIC: CPT | Performed by: PHYSICIAN ASSISTANT

## 2018-02-09 PROCEDURE — 99214 OFFICE O/P EST MOD 30 MIN: CPT | Performed by: PHYSICIAN ASSISTANT

## 2018-02-09 PROCEDURE — 85379 FIBRIN DEGRADATION QUANT: CPT | Performed by: PHYSICIAN ASSISTANT

## 2018-02-09 PROCEDURE — 93000 ELECTROCARDIOGRAM COMPLETE: CPT | Performed by: PHYSICIAN ASSISTANT

## 2018-02-09 RX ORDER — AZITHROMYCIN 250 MG/1
TABLET, FILM COATED ORAL
Qty: 6 TABLET | Refills: 0 | Status: SHIPPED | OUTPATIENT
Start: 2018-02-09 | End: 2019-11-21

## 2018-02-09 ASSESSMENT — ENCOUNTER SYMPTOMS
EYES NEGATIVE: 1
NEUROLOGICAL NEGATIVE: 1
GASTROINTESTINAL NEGATIVE: 1
PSYCHIATRIC NEGATIVE: 1
MUSCULOSKELETAL NEGATIVE: 1

## 2018-02-09 NOTE — MR AVS SNAPSHOT
After Visit Summary   2/9/2018    Joselyn Flynn    MRN: [id number]           Patient Information     Date Of Birth          1985        Visit Information        Provider Department      2/9/2018 1:05 PM Cally Rodríguez PA-C [facility]        Today's Diagnoses     Upper respiratory infection with cough and congestion    -  1    Cough        Intermittent palpitations           Follow-ups after your visit        Who to contact     If you have questions or need follow up information about today's clinic visit or your schedule please contact [facility] directly at [phone].  Normal or non-critical lab and imaging results will be communicated to you by St[employer], letter or phone within 4 business days after the clinic has received the results. If you do not hear from us within 7 days, please contact the clinic through [employer] or phone. If you have a critical or abnormal lab result, we will notify you by phone as soon as possible.  Submit refill requests through [employer] [facility] [employer] [facility] [employer] or call your pharmacy and they will forward the refill request to us. Please allow 3 business days for your refill to be completed.          Additional Information About Your Visit        MyChart Information     [employer] [facility] [employer] gives you secure access to your electronic health record. If you see a primary care provider, you can also send messages to your care team and make appointments. If you have questions, please call your primary care clinic.  If you do not have a primary care provider, please call [phone] and they will assist you.        Care EveryWhere ID     This is your Care EveryWhere ID. This could be used by other organizations to access your [place] medical records  [id number]        Your Vitals Were     Pulse Temperature Last Period Pulse Oximetry Breastfeeding? BMI (Body Mass Index)    104 99.4  F (37.4  C) (Tympanic) 01/30/2018 100% No 21.46 kg/m2       Blood  Pressure from Last 3 Encounters:   02/09/18 128/81   01/30/18 118/70   09/07/17 112/71    Weight from Last 3 Encounters:   02/09/18 135 lb (61.2 kg)   01/30/18 140 lb (63.5 kg)   09/07/17 138 lb (62.6 kg)              We Performed the Following     D dimer, quantitative     EKG 12-lead complete w/read - Clinics     Influenza A/B antigen     XR Chest 2 Views          Today's Medication Changes          These changes are accurate as of 2/9/18  8:46 PM.  If you have any questions, ask your nurse or doctor.               Start taking these medicines.        Dose/Directions    azithromycin 250 MG tablet   Commonly known as:  ZITHROMAX   Used for:  Upper respiratory infection with cough and congestion   Started by:  Cally Rodríguez PA-C        2 tablets the first day, then 1 tablet daily for the next 4 days   Quantity:  6 tablet   Refills:  0            Where to get your medicines      These medications were sent to [facility] [address] [place], [place] [address] [address] [facility] [address] [place]  [address] [facility] [address]     Phone:  [phone]     azithromycin 250 MG tablet                Primary Care Provider Office Phone # Fax #    Trinidad Jhaveri -[phone] [address] [facility]  [facility] [address]        Equal Access to Services     [facility] AH: Hadii aad ku hadasho Soomaali, waaxda luqadaha, qaybta kaalmada tona, mariusz powell. So [facility] [phone].    ATENCIÓN: Si habla español, tiene a reilly disposición servicios gratuitos de asistencia lingüística. Sergio al [phone].    We comply with applicable federal civil rights laws and [place] laws. We do not discriminate on the basis of race, color, national origin, age, disability, sex, sexual orientation, or gender identity.            Thank you!     Thank you for choosing [facility]  for your care. Our goal is always to provide you with  excellent care. Hearing back from our patients is one way we can continue to improve our services. Please take a few minutes to complete the written survey that you may receive in the mail after your visit with us. Thank you!             Your Updated Medication List - Protect others around you: Learn how to safely use, store and throw away your medicines at www.disposemymeds.org.          This list is accurate as of 2/9/18  8:46 PM.  Always use your most recent med list.                   Brand Name Dispense Instructions for use Diagnosis    azithromycin 250 MG tablet    ZITHROMAX    6 tablet    2 tablets the first day, then 1 tablet daily for the next 4 days    Upper respiratory infection with cough and congestion       BENADRYL 25 MG tablet   Generic drug:  diphenhydrAMINE      Take 25 mg by mouth every 6 hours as needed.        desogestrel-ethinyl estradiol 0.15-30 MG-MCG per tablet    DESOGEN    84 tablet    Take 1 tablet by mouth daily Patient needs to be seen prior to further refills.    Encounter for initial prescription of contraceptive pills, Encounter for gynecological examination without abnormal finding       Multi-vitamin Tabs tablet   Generic drug:  multivitamin, therapeutic with minerals      Take 1 tablet by mouth daily.

## 2018-02-09 NOTE — PROGRESS NOTES
SUBJECTIVE:   Joselyn Flynn is a 32 year old female presenting with a chief complaint of   Chief Complaint   Patient presents with     URI     Pt c/o cough, fluttering in chest. Has had a cough intermittely for 5 weeks.    .    Onset of symptoms was 5 week(s) ago, worse overnight and this morning.  Course of illness is worsening.    Severity moderate  Current and Associated symptoms: low-grade fever, cough, chills, body aches  Treatment measures tried include Fluids and Rest, Zicam, cough drops.  Predisposing factors include None.    She has had this same cough in three different rounds  Today she was sent home from work with faint palpitations/flutters  No shortness of breath  No post-tussive vomiting  She has a runny nose, not stuffy, no sinus pressure  Mild ear pain - bilateral  She has a deep cough, it comes in fits  This morning she had a headache and body aches  Ibuprofen helped  She has not taken antibiotics in several years for a URI  Denied hemoptysis  She works as a     Review of Systems   Constitutional:        As in HPI   HENT:        As in HPI   Eyes: Negative.    Respiratory:        As in HPI   Cardiovascular:        As in HPI   Gastrointestinal: Negative.    Genitourinary: Negative.    Musculoskeletal: Negative.    Skin: Negative.    Neurological: Negative.    Psychiatric/Behavioral: Negative.          Past Medical History:   Diagnosis Date     ASCUS on Pap smear 4/7/11     Cervical high risk HPV (human papillomavirus) test positive 4/7/11    HPV 16     History of colposcopy with cervical biopsy 4/26/11    ISMAEL 1     LSIL (low grade squamous intraepithelial lesion) on Pap smear 9/16/11     Poison ivy dermatitis      Current Outpatient Prescriptions   Medication Sig Dispense Refill     azithromycin (ZITHROMAX) 250 MG tablet 2 tablets the first day, then 1 tablet daily for the next 4 days 6 tablet 0     desogestrel-ethinyl estradiol (DESOGEN) 0.15-30 MG-MCG per tablet Take 1 tablet by mouth  daily Patient needs to be seen prior to further refills. 84 tablet 4     diphenhydrAMINE (BENADRYL) 25 MG tablet Take 25 mg by mouth every 6 hours as needed.       Multiple Vitamin (MULTI-VITAMIN) per tablet Take 1 tablet by mouth daily.       Social History   Substance Use Topics     Smoking status: Never Smoker     Smokeless tobacco: Never Used     Alcohol use Yes      Comment: 1 per wk       OBJECTIVE  /81 (BP Location: Left arm, Patient Position: Chair, Cuff Size: Adult Regular)  Pulse 104  Temp 99.4  F (37.4  C) (Tympanic)  Wt 135 lb (61.2 kg)  LMP 01/30/2018  SpO2 100%  Breastfeeding? No  BMI 21.46 kg/m2    Physical Exam   Constitutional: She is oriented to person, place, and time and well-developed, well-nourished, and in no distress.   HENT:   Head: Normocephalic and atraumatic.   Right Ear: Tympanic membrane and ear canal normal.   Left Ear: Tympanic membrane and ear canal normal.   Nose: Nose normal.   Mouth/Throat: Uvula is midline, oropharynx is clear and moist and mucous membranes are normal.   Eyes: Conjunctivae and EOM are normal. Pupils are equal, round, and reactive to light.   Neck: Normal range of motion. Neck supple.   Cardiovascular: Regular rhythm and normal heart sounds.  Tachycardia present.  Exam reveals no friction rub (when sitting upright and while leaning forward).    Pulmonary/Chest: Effort normal and breath sounds normal. No tachypnea. She has no decreased breath sounds. She has no wheezes.   Neurological: She is alert and oriented to person, place, and time. Gait normal.   Skin: Skin is warm and dry.   Psychiatric: Mood and affect normal.       Labs:  Results for orders placed or performed in visit on 02/09/18 (from the past 24 hour(s))   Influenza A/B antigen   Result Value Ref Range    Influenza A/B Agn Specimen Nasal     Influenza A Negative NEG^Negative    Influenza B Negative NEG^Negative   D dimer, quantitative   Result Value Ref Range    D Dimer 0.3 0.0 - 0.50  "ug/ml FEU   XR Chest 2 Views    Narrative    XR CHEST 2 VW 2/9/2018 2:51 PM     HISTORY: ; Cough; Intermittent palpitations    COMPARISON: None      Impression    IMPRESSION: The cardiac silhouette and pulmonary vasculature are  within normal limits. The lungs are clear.    KUSH ALFARO MD       EKG - WNL    ASSESSMENT:      ICD-10-CM    1. Upper respiratory infection with cough and congestion J06.9 azithromycin (ZITHROMAX) 250 MG tablet   2. Cough R05 Influenza A/B antigen     XR Chest 2 Views     D dimer, quantitative   3. Intermittent palpitations R00.2 EKG 12-lead complete w/read - Clinics     XR Chest 2 Views     D dimer, quantitative        Medical Decision Making:    EKG normal and normal chest XR - reassuring for history of \"fluttering\" heart  Will treat URI with azithromycin because symptoms are suggestive of pertussis  D-dimer to rule out PE - she is low risk but PERC positive    PLAN:    URI Adult:  Tylenol, Ibuprofen, Fluids, Rest and Zpak      Followup:    If not improving or if conditions worsens over the next 12-24 hours, go to the Emergency Department (for any palpitations)    There are no Patient Instructions on file for this visit.    Cally Rodríguez PA-C    "

## 2018-02-09 NOTE — NURSING NOTE
"Chief Complaint   Patient presents with     URI     Pt c/o cough, fluttering in chest. Has had a cough intermittely for 5 weeks.        Initial /81 (BP Location: Left arm, Patient Position: Chair, Cuff Size: Adult Regular)  Pulse 104  Temp 99.4  F (37.4  C) (Tympanic)  Wt 135 lb (61.2 kg)  LMP 01/30/2018  SpO2 100%  Breastfeeding? No  BMI 21.46 kg/m2 Estimated body mass index is 21.46 kg/(m^2) as calculated from the following:    Height as of 1/30/18: 5' 6.5\" (1.689 m).    Weight as of this encounter: 135 lb (61.2 kg).  Medication Reconciliation: complete     Aleksandra Judge CMA (AAMA)      "

## 2018-02-09 NOTE — LETTER
[facility]  [address]  [facility] [address]  Phone: [phone]    February 9, 2018        Joselyn Flynn  [address] [facility] [address]          To whom it may concern:    RE: Joselyn Flynn    Patient was seen and treated today at our clinic and missed work.  Please excuse her from work on 2/9/18 and 2/10/18    Please contact me for questions or concerns.      Sincerely,        Cally Rodríguez PA-C

## 2018-09-21 NOTE — NURSING NOTE
"Chief Complaint   Patient presents with     Derm Problem       Initial /81  Pulse 89  Temp 100.2  F (37.9  C) (Oral)  Ht 5' 5.5\" (1.664 m)  Wt 143 lb (64.9 kg)  LMP 06/19/2017 (Approximate)  SpO2 99%  Breastfeeding? No  BMI 23.43 kg/m2 Estimated body mass index is 23.43 kg/(m^2) as calculated from the following:    Height as of this encounter: 5' 5.5\" (1.664 m).    Weight as of this encounter: 143 lb (64.9 kg).  Medication Reconciliation: complete   Valery ROSADO MA      " impaired

## 2019-09-19 ENCOUNTER — RESULT FOLLOW UP (OUTPATIENT)
Dept: OBGYN | Facility: CLINIC | Age: 34
End: 2019-09-19

## 2019-09-19 ENCOUNTER — OFFICE VISIT (OUTPATIENT)
Dept: OBGYN | Facility: CLINIC | Age: 34
End: 2019-09-19
Payer: COMMERCIAL

## 2019-09-19 VITALS
DIASTOLIC BLOOD PRESSURE: 86 MMHG | SYSTOLIC BLOOD PRESSURE: 113 MMHG | OXYGEN SATURATION: 98 % | WEIGHT: 138 LBS | HEIGHT: 66 IN | HEART RATE: 125 BPM | BODY MASS INDEX: 22.18 KG/M2

## 2019-09-19 DIAGNOSIS — Z01.419 ENCOUNTER FOR GYNECOLOGICAL EXAMINATION WITHOUT ABNORMAL FINDING: Primary | ICD-10-CM

## 2019-09-19 DIAGNOSIS — R87.612 PAPANICOLAOU SMEAR OF CERVIX WITH LOW GRADE SQUAMOUS INTRAEPITHELIAL LESION (LGSIL): ICD-10-CM

## 2019-09-19 DIAGNOSIS — Z30.011 ENCOUNTER FOR INITIAL PRESCRIPTION OF CONTRACEPTIVE PILLS: ICD-10-CM

## 2019-09-19 DIAGNOSIS — Z12.4 SCREENING FOR MALIGNANT NEOPLASM OF CERVIX: ICD-10-CM

## 2019-09-19 PROCEDURE — 99395 PREV VISIT EST AGE 18-39: CPT | Performed by: OBSTETRICS & GYNECOLOGY

## 2019-09-19 PROCEDURE — G0124 SCREEN C/V THIN LAYER BY MD: HCPCS | Performed by: OBSTETRICS & GYNECOLOGY

## 2019-09-19 PROCEDURE — 87624 HPV HI-RISK TYP POOLED RSLT: CPT | Performed by: OBSTETRICS & GYNECOLOGY

## 2019-09-19 PROCEDURE — G0123 SCREEN CERV/VAG THIN LAYER: HCPCS | Performed by: OBSTETRICS & GYNECOLOGY

## 2019-09-19 RX ORDER — DESOGESTREL AND ETHINYL ESTRADIOL 0.15-0.03
1 KIT ORAL DAILY
Qty: 84 TABLET | Refills: 4 | Status: SHIPPED | OUTPATIENT
Start: 2019-09-19 | End: 2020-11-24

## 2019-09-19 ASSESSMENT — ANXIETY QUESTIONNAIRES
3. WORRYING TOO MUCH ABOUT DIFFERENT THINGS: NOT AT ALL
6. BECOMING EASILY ANNOYED OR IRRITABLE: NOT AT ALL
5. BEING SO RESTLESS THAT IT IS HARD TO SIT STILL: NOT AT ALL
2. NOT BEING ABLE TO STOP OR CONTROL WORRYING: NOT AT ALL
7. FEELING AFRAID AS IF SOMETHING AWFUL MIGHT HAPPEN: NOT AT ALL
GAD7 TOTAL SCORE: 0
IF YOU CHECKED OFF ANY PROBLEMS ON THIS QUESTIONNAIRE, HOW DIFFICULT HAVE THESE PROBLEMS MADE IT FOR YOU TO DO YOUR WORK, TAKE CARE OF THINGS AT HOME, OR GET ALONG WITH OTHER PEOPLE: NOT DIFFICULT AT ALL
1. FEELING NERVOUS, ANXIOUS, OR ON EDGE: NOT AT ALL

## 2019-09-19 ASSESSMENT — PATIENT HEALTH QUESTIONNAIRE - PHQ9
5. POOR APPETITE OR OVEREATING: NOT AT ALL
SUM OF ALL RESPONSES TO PHQ QUESTIONS 1-9: 0

## 2019-09-19 ASSESSMENT — MIFFLIN-ST. JEOR: SCORE: 1343.71

## 2019-09-19 NOTE — PROGRESS NOTES
Joselyn is a 34 year old  female who presents for annual exam.     Menses are regular q 28-30 days and normal lasting 5 days.  Menses flow: normal.  Patient's last menstrual period was 2019 (exact date).. Using oral contraceptives for contraception.  She is currently considering pregnancy.  Besides routine health maintenance, she has no other health concerns today .  GYNECOLOGIC HISTORY:  Menarche: 13  Age at first intercourse: 15 Number of lifetime partners: 10  Joselyn is sexually active with 01 male partner(s) and is currently in monogamous relationship with .    History sexually transmitted infections:No STD history and HPV  STI testing offered?  Declined  LINETTE exposure: Unknown  History of abnormal Pap smear: NO - age 30- 65 PAP every 3 years recommended  Family history of breast CA: No  Family history of uterine/ovarian CA: No    Family history of colon CA: Yes (Please explain): MATERNAL GREAT GMA    HEALTH MAINTENANCE:  Cholesterol: (  Cholesterol   Date Value Ref Range Status   2016 195 <200 mg/dL Final   2011 189 0 - 200 mg/dL Final     Comment:     LDL Cholesterol is the primary guide to therapy.   The NCEP recommends further evaluation of: patients with cholesterol <200   mg/dL   if additional risk factors are present, cholesterol >240 mg/dL, triglycerides   >150 mg/dL, or HDL <40 mg/dL.    History of abnormal lipids: No  Mammo: NO . History of abnormal Mammo: No, Not applicable.  Regular Self Breast Exams: Yes  Calcium/Vitamin D intake: source:  none Adequate? No  TSH: (  TSH   Date Value Ref Range Status   2016 2.80 0.40 - 4.00 mU/L Final    )  Pap; (  Lab Results   Component Value Date    PAP NIL 2016    PAP NIL 2013    PAP NIL 2012    )    HISTORY:  OB History    Para Term  AB Living   0 0 0 0 0 0   SAB TAB Ectopic Multiple Live Births   0 0 0 0 0     Past Medical History:   Diagnosis Date     ASCUS on Pap smear 11     Cervical  high risk HPV (human papillomavirus) test positive 4/7/11    HPV 16     History of colposcopy with cervical biopsy 4/26/11    ISMAEL 1     LSIL (low grade squamous intraepithelial lesion) on Pap smear 9/16/11     Poison ivy dermatitis      Past Surgical History:   Procedure Laterality Date     EYE SURGERY  2015    lasix     No family history on file.  Social History     Socioeconomic History     Marital status: Single     Spouse name: Not on file     Number of children: Not on file     Years of education: Not on file     Highest education level: Not on file   Occupational History     Employer: [employer]   Social Needs     Financial resource strain: Not on file     Food insecurity:     Worry: Not on file     Inability: Not on file     Transportation needs:     Medical: Not on file     Non-medical: Not on file   Tobacco Use     Smoking status: Never Smoker     Smokeless tobacco: Never Used   Substance and Sexual Activity     Alcohol use: Yes     Comment: 1 per wk     Drug use: No     Sexual activity: Yes     Partners: Male     Birth control/protection: Pill, Condom   Lifestyle     Physical activity:     Days per week: Not on file     Minutes per session: Not on file     Stress: Not on file   Relationships     Social connections:     Talks on phone: Not on file     Gets together: Not on file     Attends Sikhism service: Not on file     Active member of club or organization: Not on file     Attends meetings of clubs or organizations: Not on file     Relationship status: Not on file     Intimate partner violence:     Fear of current or ex partner: Not on file     Emotionally abused: Not on file     Physically abused: Not on file     Forced sexual activity: Not on file   Other Topics Concern     Parent/sibling w/ CABG, MI or angioplasty before 65F 55M? No      Service No     Blood Transfusions No     Caffeine Concern No     Occupational Exposure No     Hobby Hazards No     Sleep Concern No      "Stress Concern No     Weight Concern No     Special Diet No     Back Care No     Exercise Yes     Comment: occ     Bike Helmet Not Asked     Comment: doesn't ride bike outside     Seat Belt Yes     Self-Exams No   Social History Narrative     Not on file       Current Outpatient Medications:      desogestrel-ethinyl estradiol (DESOGEN) 0.15-30 MG-MCG tablet, Take 1 tablet by mouth daily Patient needs to be seen prior to further refills., Disp: 84 tablet, Rfl: 4     azithromycin (ZITHROMAX) 250 MG tablet, 2 tablets the first day, then 1 tablet daily for the next 4 days, Disp: 6 tablet, Rfl: 0     diphenhydrAMINE (BENADRYL) 25 MG tablet, Take 25 mg by mouth every 6 hours as needed., Disp: , Rfl:      Multiple Vitamin (MULTI-VITAMIN) per tablet, Take 1 tablet by mouth daily., Disp: , Rfl:      Allergies   Allergen Reactions     Seasonal Allergies        Past medical, surgical, social and family history were reviewed and updated in EPIC.    ROS:   C:     NEGATIVE for fever, chills, change in weight  I:       NEGATIVE for worrisome rashes, moles or lesions. Ongoing Paronychia on her thumb.  E:     NEGATIVE for vision changes or irritation  E/M: NEGATIVE for ear, mouth  Problems. Has been chipping tonsil stones off her own tonsils.  R:     NEGATIVE for significant cough or SOB  CV:   NEGATIVE for chest pain, palpitations or peripheral edema  GI:     NEGATIVE for nausea, abdominal pain, heartburn, or change in bowel habits  :   NEGATIVE for frequency, dysuria, hematuria, vaginal discharge, or irregular bleeding  M:     NEGATIVE for significant arthralgias or myalgia  N:      NEGATIVE for weakness, dizziness or paresthesias  E:      NEGATIVE for temperature intolerance, skin/hair changes  P:      NEGATIVE for changes in mood or affect.    EXAM:  /86   Pulse 125   Ht 1.67 m (5' 5.75\")   Wt 62.6 kg (138 lb)   LMP 09/09/2019 (Exact Date)   SpO2 98%   Breastfeeding? No   BMI 22.44 kg/m     BMI: Body mass index is " 22.44 kg/m .  Constitutional: healthy, alert and no distress  Head: Normocephalic. No masses, lesions, tenderness or abnormalities. No tonsil stones visible today.  Neck: Neck supple. Trachea midline. No adenopathy. Thyroid symmetric, normal size.   Cardiovascular: RRR.   Respiratory: Negative.   Breast: Breasts reveal mild symmetric fibrocystic densities, but there are no dominant, discrete, fixed or suspicious masses found.  Gastrointestinal: Abdomen soft, non-tender, non-distended. No masses, organomegaly.  :  Vulva:  No external lesions, normal female hair distribution, no inguinal adenopathy.    Urethra:  Midline, non-tender, well supported, no discharge  Vagina:  Moist, pink, no abnormal discharge, no lesions  Uterus:  Normal size, anteverted , non-tender, freely mobile  Ovaries:  No masses appreciated, non-tender, mobile  Rectal Exam: deferred  Musculoskeletal: extremities normal  Skin: no suspicious lesions or rashes. Disrupted left thumb nail.  Psychiatric: Affect appropriate, cooperative,mentation appears normal.     COUNSELING:   Reviewed preventive health counseling, as reflected in patient instructions       Regular exercise       Healthy diet/nutrition       Family planning       Folic Acid Counseling   reports that she has never smoked. She has never used smokeless tobacco.    Body mass index is 22.44 kg/m .    FRAX Risk Assessment    ASSESSMENT:  34 year old female with satisfactory annual exam  (Z01.419) Encounter for gynecological examination without abnormal finding  (primary encounter diagnosis)  Comment:   Plan: desogestrel-ethinyl estradiol (DESOGEN) 0.15-30        MG-MCG tablet        Discussed derm for paronychia and ENT for tonsil stones.   Discussed how to stop OCPs, when to start PNVs, how to establish prenatal care.     (Z12.4) Screening for malignant neoplasm of cervix  Comment:   Plan: Pap imaged thin layer screen with HPV -         recommended age 30 - 65 years (select HPV order         below), HPV High Risk Types DNA Cervical            (Z30.011) Encounter for initial prescription of contraceptive pills  Comment:   Plan: desogestrel-ethinyl estradiol (DESOGEN) 0.15-30        MG-MCG tablet

## 2019-09-19 NOTE — NURSING NOTE
"Chief Complaint   Patient presents with     Physical       Initial /86   Pulse 125   Ht 1.67 m (5' 5.75\")   Wt 62.6 kg (138 lb)   LMP 2019 (Exact Date)   SpO2 98%   Breastfeeding? No   BMI 22.44 kg/m   Estimated body mass index is 22.44 kg/m  as calculated from the following:    Height as of this encounter: 1.67 m (5' 5.75\").    Weight as of this encounter: 62.6 kg (138 lb).  BP completed using cuff size: regular    Questioned patient about current smoking habits.  Pt. has never smoked.          The following HM Due:  pap smear      The following patient reported/Care Every where data was sent to:  P ABSTRACT QUALITY INITIATIVES [[id number]]  N/A      patient has appointment for today              "

## 2019-09-20 ASSESSMENT — ANXIETY QUESTIONNAIRES: GAD7 TOTAL SCORE: 0

## 2019-09-30 LAB
COPATH REPORT: ABNORMAL
PAP: ABNORMAL

## 2019-10-01 LAB
FINAL DIAGNOSIS: ABNORMAL
HPV HR 12 DNA CVX QL NAA+PROBE: POSITIVE
HPV16 DNA SPEC QL NAA+PROBE: NEGATIVE
HPV18 DNA SPEC QL NAA+PROBE: NEGATIVE
SPECIMEN DESCRIPTION: ABNORMAL
SPECIMEN SOURCE CVX/VAG CYTO: ABNORMAL

## 2019-10-01 NOTE — PROGRESS NOTES
4/7/11 pap ASCUS + HPV 16 (high risk)  4/26/11 colposcopy.  Biopsy= ISMAEL I.  Recommend repeat pap in 4-6 months.  9/16/11 pap LSIL. Plan-- pap in 6 months. (Due 3/2012) reminder placed.  5/8/12 pap-- NIL. Plan--If pap is normal or LGSIL the repeat in 6 months (pap due on or about 11/8/12)  12/18/12 pap NIL. Plan--Repeat pap at [facility] in 6 months. (due 6/18/13) reminder placed.  6/18/13 pap NIL. Plan--routine screening  9/6/16 NIL pap.   09/19/19: ASC-H Pap, + HR HPV (Not 16 or 18) result. Plan [place], due bef 12/19/19.   10/02/19:Msg left to call back.  10/04/19: Pt was advised.  11/21/19 [place] bx and ECC: WNL. Plan: cotest 1 year ([facility] [place]) patient notified

## 2019-11-21 ENCOUNTER — OFFICE VISIT (OUTPATIENT)
Dept: OBGYN | Facility: CLINIC | Age: 34
End: 2019-11-21
Payer: COMMERCIAL

## 2019-11-21 VITALS
HEART RATE: 107 BPM | SYSTOLIC BLOOD PRESSURE: 116 MMHG | WEIGHT: 139.6 LBS | TEMPERATURE: 98.4 F | OXYGEN SATURATION: 99 % | BODY MASS INDEX: 22.43 KG/M2 | HEIGHT: 66 IN | DIASTOLIC BLOOD PRESSURE: 79 MMHG

## 2019-11-21 DIAGNOSIS — R87.611 ATYPICAL SQUAMOUS CELLS CANNOT EXCLUDE HIGH GRADE SQUAMOUS INTRAEPITHELIAL LESION ON CYTOLOGIC SMEAR OF CERVIX (ASC-H): Primary | ICD-10-CM

## 2019-11-21 LAB — HCG UR QL: NEGATIVE

## 2019-11-21 PROCEDURE — 88305 TISSUE EXAM BY PATHOLOGIST: CPT | Performed by: OBSTETRICS & GYNECOLOGY

## 2019-11-21 PROCEDURE — 81025 URINE PREGNANCY TEST: CPT | Performed by: OBSTETRICS & GYNECOLOGY

## 2019-11-21 PROCEDURE — 57454 BX/CURETT OF CERVIX W/SCOPE: CPT | Performed by: OBSTETRICS & GYNECOLOGY

## 2019-11-21 RX ORDER — FLUOCINONIDE 0.5 MG/G
OINTMENT TOPICAL
COMMUNITY
Start: 2018-12-20 | End: 2020-11-24

## 2019-11-21 ASSESSMENT — MIFFLIN-ST. JEOR: SCORE: 1350.97

## 2019-11-21 NOTE — PATIENT INSTRUCTIONS

## 2019-11-25 LAB — COPATH REPORT: NORMAL

## 2020-10-15 ENCOUNTER — OFFICE VISIT (OUTPATIENT)
Dept: OBGYN | Facility: CLINIC | Age: 35
End: 2020-10-15
Payer: COMMERCIAL

## 2020-10-15 VITALS
DIASTOLIC BLOOD PRESSURE: 79 MMHG | HEIGHT: 66 IN | BODY MASS INDEX: 21.92 KG/M2 | WEIGHT: 136.4 LBS | SYSTOLIC BLOOD PRESSURE: 119 MMHG | HEART RATE: 90 BPM

## 2020-10-15 DIAGNOSIS — Z23 NEED FOR TDAP VACCINATION: ICD-10-CM

## 2020-10-15 DIAGNOSIS — Z23 NEED FOR PROPHYLACTIC VACCINATION AND INOCULATION AGAINST INFLUENZA: ICD-10-CM

## 2020-10-15 DIAGNOSIS — Z01.419 ENCOUNTER FOR GYNECOLOGICAL EXAMINATION WITHOUT ABNORMAL FINDING: Primary | ICD-10-CM

## 2020-10-15 DIAGNOSIS — Z12.4 SCREENING FOR MALIGNANT NEOPLASM OF CERVIX: ICD-10-CM

## 2020-10-15 PROCEDURE — 90471 IMMUNIZATION ADMIN: CPT | Performed by: OBSTETRICS & GYNECOLOGY

## 2020-10-15 PROCEDURE — 88175 CYTOPATH C/V AUTO FLUID REDO: CPT | Performed by: OBSTETRICS & GYNECOLOGY

## 2020-10-15 PROCEDURE — 99395 PREV VISIT EST AGE 18-39: CPT | Mod: 25 | Performed by: OBSTETRICS & GYNECOLOGY

## 2020-10-15 PROCEDURE — 90715 TDAP VACCINE 7 YRS/> IM: CPT | Performed by: OBSTETRICS & GYNECOLOGY

## 2020-10-15 PROCEDURE — G0124 SCREEN C/V THIN LAYER BY MD: HCPCS | Performed by: OBSTETRICS & GYNECOLOGY

## 2020-10-15 PROCEDURE — 87624 HPV HI-RISK TYP POOLED RSLT: CPT | Performed by: OBSTETRICS & GYNECOLOGY

## 2020-10-15 PROCEDURE — 90686 IIV4 VACC NO PRSV 0.5 ML IM: CPT | Performed by: OBSTETRICS & GYNECOLOGY

## 2020-10-15 PROCEDURE — 90472 IMMUNIZATION ADMIN EACH ADD: CPT | Performed by: OBSTETRICS & GYNECOLOGY

## 2020-10-15 ASSESSMENT — MIFFLIN-ST. JEOR: SCORE: 1331.49

## 2020-10-15 ASSESSMENT — PAIN SCALES - GENERAL: PAINLEVEL: NO PAIN (0)

## 2020-10-15 NOTE — NURSING NOTE
Clinic Administered Medication Documentation      Injectable Medication Documentation    Patient was given TDAP and flu. Prior to medication administration, verified patients identity using patient s name and date of birth. Please see MAR and medication order for additional information. Patient instructed to remain in clinic for 15 minutes and report any adverse reaction to staff immediately .      Was entire vial of medication used? Yes  Vial/Syringe: Syringe  Expiration Date:  03JUL22  Was this medication supplied by the patient? Spring Flores MA

## 2020-10-20 LAB
COPATH REPORT: ABNORMAL
PAP: ABNORMAL

## 2020-10-22 ENCOUNTER — PATIENT OUTREACH (OUTPATIENT)
Dept: OBGYN | Facility: CLINIC | Age: 35
End: 2020-10-22

## 2020-10-22 NOTE — TELEPHONE ENCOUNTER
4/7/11 pap ASCUS + HPV 16 (high risk)  4/26/11 colposcopy.  Biopsy= ISMAEL I.  Recommend repeat pap in 4-6 months.  9/16/11 pap LSIL. Plan-- pap in 6 months. (Due 3/2012) reminder placed.  5/8/12 pap-- NIL. Plan--If pap is normal or LGSIL the repeat in 6 months (pap due on or about 11/8/12)  12/18/12 pap NIL. Plan--Repeat pap at [facility] in 6 months. (due 6/18/13)  6/18/13 pap NIL. Plan--routine screening  9/6/16 NIL pap.   09/19/19: ASC-H Pap, + HR HPV (Not 16 or 18) result. Plan [place].   11/21/19 [place] bx and ECC: WNL. Plan: cotest 1 year  10/15/20: ASCUS Pap, + HR HPV (not 16 or 18). Plan [place] due bef 01/15/21.

## 2020-11-23 NOTE — PROGRESS NOTES
"Chief Complaint   Patient presents with     Colposcopy       Initial Temp 98.1  F (36.7  C) (Oral)   Ht 1.67 m (5' 5.75\")   Wt 62 kg (136 lb 9.6 oz)   LMP 2020   Breastfeeding No   BMI 22.22 kg/m   Estimated body mass index is 22.22 kg/m  as calculated from the following:    Height as of this encounter: 1.67 m (5' 5.75\").    Weight as of this encounter: 62 kg (136 lb 9.6 oz).  BP completed using cuff size: regular    Questioned patient about current smoking habits.  Pt. has never smoked.          The following HM Due: NONE      The following patient reported/Care Every where data was sent to:  P ABSTRACT QUALITY INITIATIVES [[id number]]  n/a      n/a and patient has appointment for today              Joselyn NETTA Flynn is a 35 year old female  who presents for repeat colposcopy, referred by Kimberly Venegas MD. Pap smear on 10/15/2020 showed: ASCUS and with high risk HPV present: other. The prior pap showed ASC-H and with high risk HPV present: other.   11 pap ASCUS + HPV 16 (high risk)  11 colposcopy.  Biopsy= ISMAEL I.  Recommend repeat pap in 4-6 months.  11 pap LSIL. Plan-- pap in 6 months. (Due 3/2012) reminder placed.  12 pap-- NIL. Plan--If pap is normal or LGSIL the repeat in 6 months (pap due on or about 12)  12 pap NIL. Plan--Repeat pap at [facility] in 6 months. (due 13)  13 pap NIL. Plan--routine screening  16 NIL pap.   19: ASC-H Pap, + HR HPV (Not 16 or 18) result. Plan [place].   19 [place] bx and ECC: WNL. Plan: cotest 1 year  10/15/20: ASCUS Pap, + HR HPV (not 16 or 18). Plan [place] due bef 01/15/21.         Patient's last menstrual period was 2020.  UPT today is negative  Patient does not smoke  Type of contraception: none  Age at first sexual intercourse: 15  Number of sexual partners (lifetime): 10  Past GYN history: HPV  Prior cervical/vaginal disease: HPV related changes.  Prior cervical treatment: no " treatment.      PROCEDURE:      Before the procedure, it was ensured that the patient was educated regarding the nature of her findings to date, the implications, and what was to be done. She has been made aware of the role of HPV, the natural history of infection, ways to minimize her future risk, the effect of HPV on the cervix, and treatment options available should they be indicated. The details of the colposcopic procedure were reviewed. All questions were answered before proceeding, and informed consent was therefore obtained.      Speculum placed in vagina and excellent visualization of cervix acheived, cervix swabbed x 3 with acetic acid solution.      FINDINGS:  Cervix: acetowhitening noted 12:00. Large ectropion, changes at edge. Biopsy taken with tischlers.  SCJ seen?: yes   ECC done?: Yes   Satisfactory examination?: yes      ASSESSMENT: HPV related changes and ISMAEL 1.  PLAN: specimens labelled and sent to Pathology, will base further treatment on Pathology findings, post biopsy instructions given to patient, MyChart to discuss Pathology results and anticipate repeat pap smear in 12 months      Kimberly Venegas MD

## 2020-11-24 ENCOUNTER — OFFICE VISIT (OUTPATIENT)
Dept: OBGYN | Facility: CLINIC | Age: 35
End: 2020-11-24
Payer: COMMERCIAL

## 2020-11-24 VITALS
SYSTOLIC BLOOD PRESSURE: 108 MMHG | DIASTOLIC BLOOD PRESSURE: 80 MMHG | TEMPERATURE: 98.1 F | HEART RATE: 68 BPM | BODY MASS INDEX: 21.95 KG/M2 | HEIGHT: 66 IN | WEIGHT: 136.6 LBS

## 2020-11-24 DIAGNOSIS — Z76.89 ENCOUNTER FOR BIOPSY: Primary | ICD-10-CM

## 2020-11-24 DIAGNOSIS — R87.611 ATYPICAL SQUAMOUS CELLS CANNOT EXCLUDE HIGH GRADE SQUAMOUS INTRAEPITHELIAL LESION ON CYTOLOGIC SMEAR OF CERVIX (ASC-H): ICD-10-CM

## 2020-11-24 LAB — HCG UR QL: NEGATIVE

## 2020-11-24 PROCEDURE — 88341 IMHCHEM/IMCYTCHM EA ADD ANTB: CPT | Performed by: PATHOLOGY

## 2020-11-24 PROCEDURE — 57454 BX/CURETT OF CERVIX W/SCOPE: CPT | Performed by: OBSTETRICS & GYNECOLOGY

## 2020-11-24 PROCEDURE — 88305 TISSUE EXAM BY PATHOLOGIST: CPT | Performed by: PATHOLOGY

## 2020-11-24 PROCEDURE — 81025 URINE PREGNANCY TEST: CPT | Performed by: OBSTETRICS & GYNECOLOGY

## 2020-11-24 PROCEDURE — 88342 IMHCHEM/IMCYTCHM 1ST ANTB: CPT | Performed by: PATHOLOGY

## 2020-11-24 ASSESSMENT — MIFFLIN-ST. JEOR: SCORE: 1332.39

## 2020-11-24 NOTE — PATIENT INSTRUCTIONS

## 2020-12-01 LAB — COPATH REPORT: NORMAL

## 2020-12-08 ENCOUNTER — PATIENT OUTREACH (OUTPATIENT)
Dept: OBGYN | Facility: CLINIC | Age: 35
End: 2020-12-08

## 2020-12-08 DIAGNOSIS — N87.1 DYSPLASIA OF CERVIX, HIGH GRADE CIN 2: ICD-10-CM

## 2020-12-08 NOTE — TELEPHONE ENCOUNTER
4/7/11 pap ASCUS + HPV 16 (high risk)  4/26/11 colposcopy.  Biopsy= ISMAEL I.  Recommend repeat pap in 4-6 months.  9/16/11 pap LSIL. Plan-- pap in 6 months. (Due 3/2012) reminder placed.  5/8/12 pap-- NIL. Plan--If pap is normal or LGSIL the repeat in 6 months (pap due on or about 11/8/12)  12/18/12 pap NIL. Plan--Repeat pap at [facility] in 6 months. (due 6/18/13)  6/18/13 pap NIL. Plan--routine screening  9/6/16 NIL pap.   09/19/19: ASC-H Pap, + HR HPV (Not 16 or 18) result. Plan [place].   11/21/19 [place] bx and ECC: WNL. Plan: cotest 1 year  10/15/20: ASCUS Pap, + HR HPV (not 16 or 18). Plan [place] due bef 01/15/21.   10/22/20:Pt was advised.  11/24/20: [place] Bx ISMAEL 2, ECC benign. Plan LEEP now or [place] and cotest in 6 months. Provider sent the results and recommendations to the pt thru [facility]. Pt viewed. Awaiting pt's response.

## 2020-12-29 ENCOUNTER — PATIENT OUTREACH (OUTPATIENT)
Dept: OBGYN | Facility: CLINIC | Age: 35
End: 2020-12-29

## 2021-02-03 ENCOUNTER — PRENATAL OFFICE VISIT (OUTPATIENT)
Dept: NURSING | Facility: CLINIC | Age: 36
End: 2021-02-03
Payer: COMMERCIAL

## 2021-02-03 VITALS — HEIGHT: 66 IN | WEIGHT: 136 LBS | BODY MASS INDEX: 21.86 KG/M2

## 2021-02-03 DIAGNOSIS — Z34.00 ENCOUNTER FOR SUPERVISION OF NORMAL FIRST PREGNANCY: Primary | ICD-10-CM

## 2021-02-03 DIAGNOSIS — Z23 NEED FOR TDAP VACCINATION: ICD-10-CM

## 2021-02-03 PROCEDURE — 99207 PR NO CHARGE NURSE ONLY: CPT

## 2021-02-03 SDOH — HEALTH STABILITY: MENTAL HEALTH
STRESS IS WHEN SOMEONE FEELS TENSE, NERVOUS, ANXIOUS, OR CAN'T SLEEP AT NIGHT BECAUSE THEIR MIND IS TROUBLED. HOW STRESSED ARE YOU?: NOT AT ALL

## 2021-02-03 SDOH — HEALTH STABILITY: MENTAL HEALTH: HOW OFTEN DO YOU HAVE A DRINK CONTAINING ALCOHOL?: NOT ASKED

## 2021-02-03 SDOH — HEALTH STABILITY: MENTAL HEALTH: HOW MANY STANDARD DRINKS CONTAINING ALCOHOL DO YOU HAVE ON A TYPICAL DAY?: NOT ASKED

## 2021-02-03 SDOH — HEALTH STABILITY: PHYSICAL HEALTH: ON AVERAGE, HOW MANY MINUTES DO YOU ENGAGE IN EXERCISE AT THIS LEVEL?: NOT ASKED

## 2021-02-03 SDOH — HEALTH STABILITY: MENTAL HEALTH: HOW OFTEN DO YOU HAVE 6 OR MORE DRINKS ON ONE OCCASION?: NOT ASKED

## 2021-02-03 SDOH — HEALTH STABILITY: PHYSICAL HEALTH: ON AVERAGE, HOW MANY DAYS PER WEEK DO YOU ENGAGE IN MODERATE TO STRENUOUS EXERCISE (LIKE A BRISK WALK)?: NOT ASKED

## 2021-02-03 ASSESSMENT — MIFFLIN-ST. JEOR: SCORE: 1329.67

## 2021-02-03 NOTE — PROGRESS NOTES
Important Information for Provider:     New ob nurse intake by phone, first pregnancy, AMA( KAREN 2020,  1985) handouts reviewed and mailed. Discussed genetic screening. Denies any problems at this time. Has NOB with Dr Venegas 3/11/2021    Caffeine intake/servings daily - 1/2 soda   Calcium intake/servings daily - 3  Exercise 5 times weekly - describe ; , precautions given  Sunscreen used - Yes  Seatbelts used - Yes  Guns stored in the home - No  Self Breast Exam - Yes  Pap test up to date -  Yes  Eye exam up to date -  lasik   Dental exam up to date -  No  Immunizations reviewed and up to date - Yes  Abuse: Current or Past (Physical, Sexual or Emotional) - No  Do you feel safe in your environment - Yes  Do you cope well with stress - Yes  Do you suffer from insomnia - No        Prenatal OB Questionnaire  Patient supplied answers from flow sheet for:  Prenatal OB Questionnaire.  Past Medical History  Diabetes?: No  Hypertension : No  Heart disease, mitral valve prolapse or rheumatic fever?: No  An autoimmune disease such as lupus or rheumatoid arthritis?: No  Kidney disease or urinary tract infection?: No  Epilepsy, seizures or spells?: No  Migraine headaches?: No  A stroke or loss of function or sensation?: No  Any other neurological problems?: No  Have you ever been treated for depression?: No  Are you having problems with crying spells or loss of self-esteem?: No  Have you ever required psychiatric care?: No  Have you ever had hepatitis, liver disease or jaundice?: No  Have you been treated for blood clots in your veins, deep vein thromosis, inflammation in the veins, thrombosis, phlebitis, pulmonary embolism or varicosities?: No  Have you had excessive bleeding after surgery or dental work?: No  Do you bleed more than other women after a cut or scratch?: No  Do you have a history of anemia?: No  Have you ever had thyroid problems or taken thyroid medication?: No   Do you have any endocrine  problems?: No  Have you ever been in a major accident or suffered serious trauma?: No  Within the last year, has anyone hit, slapped, kicked or otherwise hurt you?: No  In the last year, has anyone forced you to have sex when you didn't want to?: No    Past Medical History 2   Have you ever received a blood transfusion?: No  Would you refuse a blood transfusion if a doctor judged it to be medically necessary?: No   If you answered Yes, would you rather die than receive a blood transfusion?: No  If you answered Yes, is this for Mandaeism reasons?: No  Does anyone in your home smoke?:  outside  Do you use tobacco products?: No  Do you drink beer, wine or hard liquor?: No  Do you use any of the following: marijuana, speed, cocaine, heroin, hallucinogens or other drugs?: No   Is your blood type Rh negative?: Unknown  Have you ever had abnormal antibodies in your blood?: Unknown  Have you ever had asthma?: No  Have you ever had tuberculosis?: No  Do you have any allergies to drugs or over-the-counter medications?: No  Allergies: Dust Mites, Aspartame, Ethanol, Venlafaxine, Hydrochloride, Sertraline: Yes  Have you had any breast problems?: No  Have you ever ?: No  Have you had any gynecological surgical procedures such as cervical conization, a LEEP procedure, laser treatment, cryosurgery of the cervix or a dilation and curettage, etc?: No  Have you ever had any other surgical procedures?: (!) Yes lasik  Have you been hospitalized for a nonsurgical reason excluding normal delivery?: No  Have you ever had any anesthetic complications?: No  Have you ever had an abnormal pap smear?: (!) Yes colp done    Past Medical History (Continued)  Do you have a history of abnormalities of the uterus?: No  Did your mother take LINETTE or any other hormones when she was pregnant with you?: No  Did it take you more than a year to become pregnant?: No  Have you ever been evaluated or treated for infertility?: No  Is there a  history of medical problems in your family, which you feel may be important to this pregnancy?: No  Do you have any other problems we have not asked about which you feel may be important to this pregnancy?: No    Symptoms since last menstrual period  Do you have any of the following symptoms: abdominal pain, blood in stools or urine, chest pain, shortness of breath, coughing or vomiting up blood, your heart racing or skipping beats, nausea and vomiting, pain on urination or vaginal discharge or bleed: (!) Yes  Will the patient be 35 years old or older at the time of delivery?: No    Has the patient, baby's father or anyone in either family had:  Thalassemia (Italian, Greek, Mediterranean or  background only) and an MCV result less than 80?: No  Neural tube defect such as meningomyelocele, spina bifida or anencephaly?: No  Congenital heart defect?: No  Down's Syndrome?: patient paternal uncle, husbands maternal uncle  Allan-Sachs disease (Sabianism, Cajun, Swedish-Niuean)?: No  Sickle cell disease or trait ()?: No  Hemophilia or other inherited problems of blood?: No  Muscular dystrophy?: No  Cystic fibrosis?: No  Leon's chorea?: No  Mental retardation/autism?: No  If yes, was the person tested for fragile X?: No  Any other inherited genetic or chromosomal disorder?: No  Maternal metabolic disorder (e.g Insulin-dependent diabetes, PKU)?: No  A child with birth defects not listed above?: No  Recurrent pregnancy loss or stillbirth?: No   Has the patient had any medications/street drugs/alcohol since her last menstrual period?: No  Does the patient or baby's father have any other genetic risks?: No    Infection History   Do you object to being tested for Hepatitis B?: No  Do you object to being tested for HIV?: No   Do you feel that you are at high risk for coming in contact with the AIDS virus?: No  Have you ever been treated for tuberculosis?: No  Have you ever had a positive skin test for tuberculosis?:  No  Do you live with someone who has tuberculosis?: No  Have you ever been exposed to tuberculosis?: No  Do you have genital herpes?: No  Does your partner have genital herpes?: No  Have you had a viral illness since your last period?: No  Have you ever had gonorrhea, chlamydia, syphilis, venereal warts, trichomoniasis, pelvic inflammatory disease or any other sexually transmitted disease?: HPV  Do you know if you are a genital group B streptococcus carrier?: No  Have you had chicken pox/varicella?: (!) Yes   Have you been vaccinated against chicken Pox?: No  Have you had any other infectious diseases?: [place] in second grade    Allergies as of 2/3/2021:    Allergies as of 02/03/2021 - Reviewed 11/24/2020   Allergen Reaction Noted     Seasonal allergies  09/06/2016       Current medications are:  Current Outpatient Medications   Medication Sig Dispense Refill     diphenhydrAMINE (BENADRYL) 25 MG tablet Take 25 mg by mouth every 6 hours as needed.       Prenatal Vit-Fe Fumarate-FA (PRENATAL VITAMIN PO)            Early ultrasound screening tool:    Does patient have irregular periods?  No  Did patient use hormonal birth control in the three months prior to positive urine pregnancy test? No  Is the patient breastfeeding?  No  Is the patient 10 weeks or greater at time of education visit?  No

## 2021-02-11 ENCOUNTER — PATIENT OUTREACH (OUTPATIENT)
Dept: OBGYN | Facility: CLINIC | Age: 36
End: 2021-02-11

## 2021-02-11 DIAGNOSIS — N87.1 DYSPLASIA OF CERVIX, HIGH GRADE CIN 2: ICD-10-CM

## 2021-02-11 NOTE — TELEPHONE ENCOUNTER
Belinda Venegas,   Please review and advise.     36 yo, with abnormal pap hx listed below. Patient was advised to have LEEP or colp with ECC, but this was not completed. Patient is now 7w2d pregnant. Pt has OB check with you on 3/11/21. Please provide follow up plan for abnormal paps.      Thank you,   Kathy Jacinto, BSN, RN-BC     4/7/11 pap ASCUS + HPV 16 (high risk)  4/26/11 colposcopy.  Biopsy= ISMAEL I.  Recommend repeat pap in 4-6 months.  9/16/11 pap LSIL. Plan-- pap in 6 months. (Due 3/2012) reminder placed.  5/8/12 pap-- NIL. Plan--If pap is normal or LGSIL the repeat in 6 months (pap due on or about 11/8/12)  12/18/12 pap NIL. Plan--Repeat pap at [facility] in 6 months. (due 6/18/13)  6/18/13 pap NIL. Plan--routine screening  9/6/16 NIL pap.   09/19/19: ASC-H Pap, + HR HPV (Not 16 or 18) result. Plan [place].   11/21/19 [place] bx and ECC: WNL. Plan: cotest 1 year  10/15/20: ASCUS Pap, + HR HPV (not 16 or 18). Plan [place] due bef 01/15/21.   10/22/20:Pt was advised.  11/24/20: [place] Bx ISMAEL 2, ECC benign. Plan LEEP now or [place] and cotest in 6 months. Provider sent the results and recommendations to the pt thru [facility]. Pt viewed. Awaiting pt's response.    12/29/20 Pt has decided to schedule LEEP. Extended next outreach to give patient time to schedule

## 2021-03-11 ENCOUNTER — PRENATAL OFFICE VISIT (OUTPATIENT)
Dept: OBGYN | Facility: CLINIC | Age: 36
End: 2021-03-11
Payer: COMMERCIAL

## 2021-03-11 VITALS
HEART RATE: 106 BPM | BODY MASS INDEX: 22.18 KG/M2 | SYSTOLIC BLOOD PRESSURE: 125 MMHG | WEIGHT: 136.4 LBS | DIASTOLIC BLOOD PRESSURE: 75 MMHG | TEMPERATURE: 98.8 F

## 2021-03-11 DIAGNOSIS — Z34.01 ENCOUNTER FOR SUPERVISION OF NORMAL FIRST PREGNANCY IN FIRST TRIMESTER: ICD-10-CM

## 2021-03-11 LAB
ABO + RH BLD: NORMAL
ABO + RH BLD: NORMAL
ALBUMIN UR-MCNC: NEGATIVE MG/DL
APPEARANCE UR: CLEAR
BILIRUB UR QL STRIP: NEGATIVE
BLD GP AB SCN SERPL QL: NORMAL
BLOOD BANK CMNT PATIENT-IMP: NORMAL
COLOR UR AUTO: YELLOW
ERYTHROCYTE [DISTWIDTH] IN BLOOD BY AUTOMATED COUNT: 14.7 % (ref 10–15)
GLUCOSE UR STRIP-MCNC: NEGATIVE MG/DL
HBV SURFACE AG SERPL QL IA: NONREACTIVE
HCT VFR BLD AUTO: 37.8 % (ref 35–47)
HGB BLD-MCNC: 12.9 G/DL (ref 11.7–15.7)
HGB UR QL STRIP: NEGATIVE
HIV 1+2 AB+HIV1 P24 AG SERPL QL IA: NONREACTIVE
KETONES UR STRIP-MCNC: NEGATIVE MG/DL
LEUKOCYTE ESTERASE UR QL STRIP: NEGATIVE
MCH RBC QN AUTO: 29.3 PG (ref 26.5–33)
MCHC RBC AUTO-ENTMCNC: 34.1 G/DL (ref 31.5–36.5)
MCV RBC AUTO: 86 FL (ref 78–100)
NITRATE UR QL: NEGATIVE
PH UR STRIP: 7 PH (ref 5–7)
PLATELET # BLD AUTO: 192 10E9/L (ref 150–450)
RBC # BLD AUTO: 4.41 10E12/L (ref 3.8–5.2)
SOURCE: NORMAL
SP GR UR STRIP: 1.01 (ref 1–1.03)
SPECIMEN EXP DATE BLD: NORMAL
T PALLIDUM AB SER QL: NONREACTIVE
UROBILINOGEN UR STRIP-ACNC: 0.2 EU/DL (ref 0.2–1)
WBC # BLD AUTO: 4.5 10E9/L (ref 4–11)

## 2021-03-11 PROCEDURE — 86850 RBC ANTIBODY SCREEN: CPT | Performed by: OBSTETRICS & GYNECOLOGY

## 2021-03-11 PROCEDURE — 86780 TREPONEMA PALLIDUM: CPT | Mod: 90 | Performed by: OBSTETRICS & GYNECOLOGY

## 2021-03-11 PROCEDURE — 86762 RUBELLA ANTIBODY: CPT | Performed by: OBSTETRICS & GYNECOLOGY

## 2021-03-11 PROCEDURE — 85027 COMPLETE CBC AUTOMATED: CPT | Performed by: OBSTETRICS & GYNECOLOGY

## 2021-03-11 PROCEDURE — 87086 URINE CULTURE/COLONY COUNT: CPT | Performed by: OBSTETRICS & GYNECOLOGY

## 2021-03-11 PROCEDURE — 36415 COLL VENOUS BLD VENIPUNCTURE: CPT | Performed by: OBSTETRICS & GYNECOLOGY

## 2021-03-11 PROCEDURE — 99000 SPECIMEN HANDLING OFFICE-LAB: CPT | Performed by: OBSTETRICS & GYNECOLOGY

## 2021-03-11 PROCEDURE — 87389 HIV-1 AG W/HIV-1&-2 AB AG IA: CPT | Performed by: OBSTETRICS & GYNECOLOGY

## 2021-03-11 PROCEDURE — 81003 URINALYSIS AUTO W/O SCOPE: CPT | Performed by: OBSTETRICS & GYNECOLOGY

## 2021-03-11 PROCEDURE — 99213 OFFICE O/P EST LOW 20 MIN: CPT | Performed by: OBSTETRICS & GYNECOLOGY

## 2021-03-11 PROCEDURE — 86900 BLOOD TYPING SEROLOGIC ABO: CPT | Performed by: OBSTETRICS & GYNECOLOGY

## 2021-03-11 PROCEDURE — 86901 BLOOD TYPING SEROLOGIC RH(D): CPT | Performed by: OBSTETRICS & GYNECOLOGY

## 2021-03-11 PROCEDURE — 87340 HEPATITIS B SURFACE AG IA: CPT | Performed by: OBSTETRICS & GYNECOLOGY

## 2021-03-11 NOTE — NURSING NOTE
"Chief Complaint   Patient presents with     Prenatal Care     11+2       Initial /75   Pulse 106   Temp 98.8  F (37.1  C) (Oral)   Wt 61.9 kg (136 lb 6.4 oz)   LMP 2020   BMI 22.18 kg/m   Estimated body mass index is 22.18 kg/m  as calculated from the following:    Height as of 2/3/21: 1.67 m (5' 5.75\").    Weight as of this encounter: 61.9 kg (136 lb 6.4 oz).  BP completed using cuff size: regular    Questioned patient about current smoking habits.  Pt. has never smoked.          The following HM Due: NONE      The following patient reported/Care Every where data was sent to:  P ABSTRACT QUALITY INITIATIVES [[id number]]        patient has appointment for today  Annamaria King                "

## 2021-03-12 LAB
BACTERIA SPEC CULT: NO GROWTH
Lab: NORMAL
RUBV IGG SERPL IA-ACNC: 74 IU/ML
SPECIMEN SOURCE: NORMAL

## 2021-03-25 NOTE — TELEPHONE ENCOUNTER
3/11/21 New OB. Estimated Date of Delivery: Sep 28, 2021. ISMAEL 2 on last colp in 11/2020. Was planning LEEP then conceived. Plan colp next visit.   4/16/21 visit for colp

## 2021-04-16 ENCOUNTER — OFFICE VISIT (OUTPATIENT)
Dept: OBGYN | Facility: CLINIC | Age: 36
End: 2021-04-16
Payer: COMMERCIAL

## 2021-04-16 VITALS
SYSTOLIC BLOOD PRESSURE: 127 MMHG | DIASTOLIC BLOOD PRESSURE: 78 MMHG | HEART RATE: 104 BPM | BODY MASS INDEX: 22.65 KG/M2 | WEIGHT: 139.3 LBS

## 2021-04-16 DIAGNOSIS — N87.1 DYSPLASIA OF CERVIX, HIGH GRADE CIN 2: Primary | ICD-10-CM

## 2021-04-16 DIAGNOSIS — Z34.02 ENCOUNTER FOR SUPERVISION OF NORMAL FIRST PREGNANCY IN SECOND TRIMESTER: ICD-10-CM

## 2021-04-16 PROCEDURE — 99207 PR PRENATAL VISIT: CPT | Performed by: OBSTETRICS & GYNECOLOGY

## 2021-04-16 PROCEDURE — 88305 TISSUE EXAM BY PATHOLOGIST: CPT | Performed by: PATHOLOGY

## 2021-04-16 PROCEDURE — 87624 HPV HI-RISK TYP POOLED RSLT: CPT | Performed by: OBSTETRICS & GYNECOLOGY

## 2021-04-16 PROCEDURE — 57455 BIOPSY OF CERVIX W/SCOPE: CPT | Performed by: OBSTETRICS & GYNECOLOGY

## 2021-04-16 PROCEDURE — 88175 CYTOPATH C/V AUTO FLUID REDO: CPT | Performed by: OBSTETRICS & GYNECOLOGY

## 2021-04-16 RX ORDER — ASPIRIN 81 MG/1
81 TABLET ORAL DAILY
COMMUNITY
End: 2022-07-21

## 2021-04-16 NOTE — PROGRESS NOTES
Joselyn Flynn is a 35 year old female  who presents for repeat colposcopy, referred by AO. Pap smear on 10/15/20 showed: ASCUS and with high risk HPV present: other. The prior pap showed ASCUS.   11 pap ASCUS + HPV 16 (high risk)  11 colposcopy.  Biopsy= ISMAEL I.  Recommend repeat pap in 4-6 months.  11 pap LSIL. Plan-- pap in 6 months. (Due 3/2012) reminder placed.  12 pap-- NIL. Plan--If pap is normal or LGSIL the repeat in 6 months (pap due on or about 12)  12 pap NIL. Plan--Repeat pap at [facility] in 6 months. (due 13)  13 pap NIL. Plan--routine screening  16 NIL pap.   19: ASC-H Pap, + HR HPV (Not 16 or 18) result. Plan [place].   19 [place] bx and ECC: WNL. Plan: cotest 1 year  10/15/20: ASCUS Pap, + HR HPV (not 16 or 18). Plan [place] due bef 01/15/21.   10/22/20:Pt was advised.  20: [place] Bx ISMAEL 2, ECC benign. Plan LEEP now or [place] and cotest in 6 months. Provider [facility] the results and recommendations [facility] the [facility]. Pt viewed. Awaiting pt's response.    20 Pt has decided to schedule LEEP. Extended next outreach to give patient time to schedule  21 Pt newly pregnant, currently 7w2d. Enc sent to provider for alternate plan.  3/11/21 New OB. Estimated Date of Delivery: Sep 28, 2021. ISMAEL 2 on last colp in 2020. Was planning LEEP then conceived. Plan colp next visit.   21 visit for colp            Patient's last menstrual period was 2020.  UPT today is positive  Patient does smoke  Type of contraception: none  Age at first sexual intercourse: 15  Number of sexual partners (lifetime): 6+  Past GYN history: HPV  Prior cervical/vaginal disease: ISMAEL 2.  Prior cervical treatment: no treatment.      PROCEDURE:      Before the procedure, it was ensured that the patient was educated regarding the nature of her findings to date, the implications, and what was to be done. She has been made aware of the role of HPV, the natural history  of infection, ways to minimize her future risk, the effect of HPV on the cervix, and treatment options available should they be indicated. The details of the colposcopic procedure were reviewed. All questions were answered before proceeding, and informed consent was therefore obtained.      Speculum placed in vagina and excellent visualization of cervix acheived, cervix swabbed x 3 with acetic acid solution.      FINDINGS:  Cervix: acetowhitening noted 1:00 and 7:00. Biopsies taken with tischler forceps. Bleeding at biopsy site addressed with monsels solution.  SCJ seen?: yes   ECC done?: No  Satisfactory examination?: yes      ASSESSMENT: ISMAEL 1.  PLAN: specimens labelled and sent to Pathology, will base further treatment on Pathology findings, post biopsy instructions given to patient, Mychart to discuss Pathology results and  Repeat pap smear and colposcopy in 6 months      Kimberly Venegas MD

## 2021-04-17 NOTE — PROGRESS NOTES
Doing well.   Feeling well.   Fetal survey ordered.   Colposcopy today for high grade dysplasia. See separate note. If biopsies stable plan pap/colp postpartum.   RTC 3 weeks

## 2021-04-20 LAB
COPATH REPORT: NORMAL
PAP: NORMAL

## 2021-04-22 LAB
COPATH REPORT: NORMAL
FINAL DIAGNOSIS: ABNORMAL
HPV HR 12 DNA CVX QL NAA+PROBE: POSITIVE
HPV16 DNA SPEC QL NAA+PROBE: NEGATIVE
HPV18 DNA SPEC QL NAA+PROBE: NEGATIVE
SPECIMEN DESCRIPTION: ABNORMAL
SPECIMEN SOURCE CVX/VAG CYTO: ABNORMAL

## 2021-04-26 ENCOUNTER — PATIENT OUTREACH (OUTPATIENT)
Dept: OBGYN | Facility: CLINIC | Age: 36
End: 2021-04-26
Payer: COMMERCIAL

## 2021-04-26 NOTE — TELEPHONE ENCOUNTER
4/16/21 [place] Bx's Neg for dysplasia. NIL Pap, + HR HPV (not 16 or 18),  pregnant pt, Plan cotest postpartum EDC 09/28/21.

## 2021-05-19 ENCOUNTER — PRENATAL OFFICE VISIT (OUTPATIENT)
Dept: OBGYN | Facility: CLINIC | Age: 36
End: 2021-05-19
Attending: OBSTETRICS & GYNECOLOGY
Payer: COMMERCIAL

## 2021-05-19 ENCOUNTER — ANCILLARY PROCEDURE (OUTPATIENT)
Dept: ULTRASOUND IMAGING | Facility: CLINIC | Age: 36
End: 2021-05-19
Attending: OBSTETRICS & GYNECOLOGY
Payer: COMMERCIAL

## 2021-05-19 VITALS
HEIGHT: 66 IN | WEIGHT: 144.5 LBS | SYSTOLIC BLOOD PRESSURE: 115 MMHG | HEART RATE: 100 BPM | DIASTOLIC BLOOD PRESSURE: 68 MMHG | OXYGEN SATURATION: 97 % | BODY MASS INDEX: 23.22 KG/M2

## 2021-05-19 DIAGNOSIS — N87.1 DYSPLASIA OF CERVIX, HIGH GRADE CIN 2: ICD-10-CM

## 2021-05-19 DIAGNOSIS — Z20.828 CONTACT WITH OR EXPOSURE TO VIRAL DISEASE: ICD-10-CM

## 2021-05-19 DIAGNOSIS — Z34.02 ENCOUNTER FOR SUPERVISION OF NORMAL FIRST PREGNANCY IN SECOND TRIMESTER: Primary | ICD-10-CM

## 2021-05-19 PROCEDURE — 99207 PR PRENATAL VISIT: CPT | Performed by: OBSTETRICS & GYNECOLOGY

## 2021-05-19 PROCEDURE — 86769 SARS-COV-2 COVID-19 ANTIBODY: CPT | Performed by: OBSTETRICS & GYNECOLOGY

## 2021-05-19 PROCEDURE — 36415 COLL VENOUS BLD VENIPUNCTURE: CPT | Performed by: OBSTETRICS & GYNECOLOGY

## 2021-05-19 PROCEDURE — 81511 FTL CGEN ABNOR FOUR ANAL: CPT | Mod: 90 | Performed by: OBSTETRICS & GYNECOLOGY

## 2021-05-19 PROCEDURE — 99000 SPECIMEN HANDLING OFFICE-LAB: CPT | Performed by: OBSTETRICS & GYNECOLOGY

## 2021-05-19 PROCEDURE — 76805 OB US >/= 14 WKS SNGL FETUS: CPT | Performed by: OBSTETRICS & GYNECOLOGY

## 2021-05-19 ASSESSMENT — MIFFLIN-ST. JEOR: SCORE: 1368.23

## 2021-05-20 NOTE — PROGRESS NOTES
Doing well.   Feeling fetal movement.   Fetal survey prelim normal today. Did not disclose fetal sex.   Cervical biopsies and pap normal - repeat PP.   Answered questions about activity in pregnancy.   Quad screen today.   Requested covid antibody testing - feels like it will help her decide about vaccine. Discussed differences in immunity from native COVID and vaccine, as well as antibody transfer to fetus. Discussed risk of COVID illness versus risks of vaccine. Questions answered.   RTC 4 weeks, discussed GCT next visit.

## 2021-05-21 LAB
SARS-COV-2 AB PNL SERPL IA: NEGATIVE
SARS-COV-2 IGG SERPL IA-ACNC: NORMAL

## 2021-05-22 LAB
# FETUSES US: NORMAL
# FETUSES: NORMAL
AFP ADJ MOM AMN: 1.14
AFP SERPL-MCNC: 80 NG/ML
AGE - REPORTED: 35 YR
CURRENT SMOKER: NO
CURRENT SMOKER: NO
DIABETES STATUS PATIENT: NO
EGG DONOR AGE: NO
FAMILY MEMBER DISEASES HX: NO
FAMILY MEMBER DISEASES HX: NO
GA METHOD: NORMAL
GA METHOD: NORMAL
GA: NORMAL WK
HCG MOM SERPL: 1.52
HCG SERPL-ACNC: NORMAL IU/L
HX OF HEREDITARY DISORDERS: NO
IDDM PATIENT QL: NO
INHIBIN A MOM SERPL: 1.27
INHIBIN A SERPL-MCNC: 228 PG/ML
INTEGRATED SCN PATIENT-IMP: NORMAL
IVF PREGNANCY: NO
LMP START DATE: NORMAL
MONOCHORIONIC TWINS: NO
PATHOLOGY STUDY: NORMAL
PREV FETUS DEFECT: NO
SERVICE CMNT-IMP: NO
SPECIMEN DRAWN SERPL: NORMAL
U ESTRIOL MOM SERPL: 1.04
U ESTRIOL SERPL-MCNC: 3.03 NG/ML
VALPROIC/CARBAMAZEPINE STATUS: NO
WEIGHT UNITS: NORMAL

## 2021-06-17 ENCOUNTER — PRENATAL OFFICE VISIT (OUTPATIENT)
Dept: OBGYN | Facility: CLINIC | Age: 36
End: 2021-06-17
Payer: COMMERCIAL

## 2021-06-17 VITALS
WEIGHT: 150.4 LBS | BODY MASS INDEX: 24.46 KG/M2 | SYSTOLIC BLOOD PRESSURE: 116 MMHG | HEART RATE: 81 BPM | DIASTOLIC BLOOD PRESSURE: 73 MMHG

## 2021-06-17 DIAGNOSIS — Z34.02 ENCOUNTER FOR SUPERVISION OF NORMAL FIRST PREGNANCY IN SECOND TRIMESTER: ICD-10-CM

## 2021-06-17 LAB
GLUCOSE 1H P 50 G GLC PO SERPL-MCNC: 90 MG/DL (ref 60–129)
HCV AB SERPL QL IA: NONREACTIVE
HGB BLD-MCNC: 11.5 G/DL (ref 11.7–15.7)

## 2021-06-17 PROCEDURE — 86803 HEPATITIS C AB TEST: CPT | Performed by: OBSTETRICS & GYNECOLOGY

## 2021-06-17 PROCEDURE — 99207 PR PRENATAL VISIT: CPT | Performed by: OBSTETRICS & GYNECOLOGY

## 2021-06-17 PROCEDURE — 36415 COLL VENOUS BLD VENIPUNCTURE: CPT | Performed by: OBSTETRICS & GYNECOLOGY

## 2021-06-17 PROCEDURE — 82950 GLUCOSE TEST: CPT | Performed by: OBSTETRICS & GYNECOLOGY

## 2021-06-17 PROCEDURE — 99N1025 PR STATISTIC OBHBG - HEMOGLOBIN: Performed by: OBSTETRICS & GYNECOLOGY

## 2021-06-17 ASSESSMENT — PATIENT HEALTH QUESTIONNAIRE - PHQ9: SUM OF ALL RESPONSES TO PHQ QUESTIONS 1-9: 0

## 2021-06-17 NOTE — PROGRESS NOTES
Childbirth classes? Unsure, will check with insurance, will send links.  Plan on breastfeeding? Yes: has a pump on the way  Birthcontrol? Unsure, might not do anything as it took a long time to conceive  Sex on ultrasound? did not determine  Circumsion? Yes, discussed  Peds doc? Prob [facility]    Good fetal movement.   Answered questions about COVID vaccine - she thinks she will get soon. She has a pregnant friend who just had COVID and was sick for three weeks.   GCT today with hgb and hep c for routine ob screening.  RTC 4 weeks.

## 2021-07-08 ENCOUNTER — PRENATAL OFFICE VISIT (OUTPATIENT)
Dept: OBGYN | Facility: CLINIC | Age: 36
End: 2021-07-08
Payer: COMMERCIAL

## 2021-07-08 VITALS
WEIGHT: 153 LBS | BODY MASS INDEX: 24.59 KG/M2 | HEART RATE: 83 BPM | DIASTOLIC BLOOD PRESSURE: 68 MMHG | HEIGHT: 66 IN | TEMPERATURE: 98.7 F | SYSTOLIC BLOOD PRESSURE: 117 MMHG

## 2021-07-08 DIAGNOSIS — Z34.03 ENCOUNTER FOR SUPERVISION OF NORMAL FIRST PREGNANCY IN THIRD TRIMESTER: Primary | ICD-10-CM

## 2021-07-08 PROCEDURE — 99207 PR PRENATAL VISIT: CPT | Performed by: OBSTETRICS & GYNECOLOGY

## 2021-07-08 ASSESSMENT — MIFFLIN-ST. JEOR: SCORE: 1406.78

## 2021-07-08 NOTE — PROGRESS NOTES
Doing well.   Has COVID #2 tomorrow  TDaP next visit.  Good fetal movement.  Going to [place], discussed travel precautions.  RTC 4 weeks

## 2021-08-12 ENCOUNTER — PRENATAL OFFICE VISIT (OUTPATIENT)
Dept: OBGYN | Facility: CLINIC | Age: 36
End: 2021-08-12
Payer: COMMERCIAL

## 2021-08-12 VITALS
BODY MASS INDEX: 25.01 KG/M2 | TEMPERATURE: 98.7 F | DIASTOLIC BLOOD PRESSURE: 71 MMHG | WEIGHT: 155.6 LBS | HEIGHT: 66 IN | SYSTOLIC BLOOD PRESSURE: 110 MMHG | HEART RATE: 82 BPM

## 2021-08-12 DIAGNOSIS — O26.843 UTERINE SIZE-DATE DISCREPANCY IN THIRD TRIMESTER: ICD-10-CM

## 2021-08-12 DIAGNOSIS — Z23 NEED FOR TDAP VACCINATION: Primary | ICD-10-CM

## 2021-08-12 PROCEDURE — 90715 TDAP VACCINE 7 YRS/> IM: CPT | Performed by: OBSTETRICS & GYNECOLOGY

## 2021-08-12 PROCEDURE — 90471 IMMUNIZATION ADMIN: CPT | Performed by: OBSTETRICS & GYNECOLOGY

## 2021-08-12 PROCEDURE — 99207 PR PRENATAL VISIT: CPT | Performed by: OBSTETRICS & GYNECOLOGY

## 2021-08-12 ASSESSMENT — MIFFLIN-ST. JEOR: SCORE: 1418.58

## 2021-08-12 NOTE — PROGRESS NOTES
Clinic Administered Medication Documentation          Injectable Medication Documentation    Patient was given Tdap. Prior to medication administration, verified patients identity using patient s name and date of birth. Please see MAR and medication order for additional information. Patient instructed to remain in clinic for 15 minutes.      Was entire vial of medication used? Yes  Vial/Syringe: Syringe  Expiration Date:  2/7/2023  Was this medication supplied by the patient? No

## 2021-08-12 NOTE — PROGRESS NOTES
Doing well.   Good fetal movement.   Having some low back pain when working longer shifts. Would like to try cat-cow stretches first, maybe brace next visit.   Measuring small today, will get growth US.  Had COVID vaccine, got a low fever. TDaP today.   RTC 2 weeks.

## 2021-08-18 ENCOUNTER — HOSPITAL ENCOUNTER (OUTPATIENT)
Facility: CLINIC | Age: 36
End: 2021-08-18
Admitting: OBSTETRICS & GYNECOLOGY
Payer: COMMERCIAL

## 2021-08-18 ENCOUNTER — HOSPITAL ENCOUNTER (OUTPATIENT)
Facility: CLINIC | Age: 36
Discharge: HOME | End: 2021-08-18
Attending: OBSTETRICS & GYNECOLOGY | Admitting: OBSTETRICS & GYNECOLOGY
Payer: COMMERCIAL

## 2021-08-18 ENCOUNTER — HOSPITAL ENCOUNTER (OUTPATIENT)
Dept: ULTRASOUND IMAGING | Facility: CLINIC | Age: 36
Discharge: HOME | End: 2021-08-18
Attending: OBSTETRICS & GYNECOLOGY | Admitting: OBSTETRICS & GYNECOLOGY
Payer: COMMERCIAL

## 2021-08-18 VITALS — RESPIRATION RATE: 18 BRPM | SYSTOLIC BLOOD PRESSURE: 120 MMHG | DIASTOLIC BLOOD PRESSURE: 69 MMHG | TEMPERATURE: 98.9 F

## 2021-08-18 DIAGNOSIS — O26.843 UTERINE SIZE-DATE DISCREPANCY IN THIRD TRIMESTER: ICD-10-CM

## 2021-08-18 PROBLEM — Z36.89 ENCOUNTER FOR TRIAGE IN PREGNANT PATIENT: Status: ACTIVE | Noted: 2021-08-18

## 2021-08-18 PROCEDURE — 76816 OB US FOLLOW-UP PER FETUS: CPT

## 2021-08-18 PROCEDURE — 76816 OB US FOLLOW-UP PER FETUS: CPT | Mod: 26 | Performed by: RADIOLOGY

## 2021-08-18 PROCEDURE — 99212 OFFICE O/P EST SF 10 MIN: CPT | Mod: 25 | Performed by: OBSTETRICS & GYNECOLOGY

## 2021-08-18 PROCEDURE — 59025 FETAL NON-STRESS TEST: CPT | Mod: 26 | Performed by: OBSTETRICS & GYNECOLOGY

## 2021-08-18 PROCEDURE — G0463 HOSPITAL OUTPT CLINIC VISIT: HCPCS | Mod: 25

## 2021-08-18 PROCEDURE — 59025 FETAL NON-STRESS TEST: CPT

## 2021-08-18 RX ORDER — LIDOCAINE 40 MG/G
CREAM TOPICAL
Status: DISCONTINUED | OUTPATIENT
Start: 2021-08-18 | End: 2021-08-19 | Stop reason: HOSPADM

## 2021-08-19 NOTE — PROGRESS NOTES
[facility]    History and Physical  Obstetrics and Gynecology     Date of Admission:  2021    Assessment & Plan   Joselyn Flynn is a 35 year old female who presents for fetal monitoring  ASSESSMENT:   IUP @ 34w1d with fetal tachycardia on ultrasound today.  NST reactive.  Category  I    PLAN:   Discharge home  Precautions reviewed.     Kimberly Venegas    History of Present Illness   Joselyn Flynn is a 35 year old female  34w1d  Estimated Date of Delivery: 21 is calculated from Patient's last menstrual period was 2020. is [facility] the [facility]  not in labor.    Ultrasound for growth was normal today -except for fetal tachycardia at 180 bpm.     PRENATAL COURSE  Prenatal course was   complicated by    Patient Active Problem List    Diagnosis Date Noted     Encounter for triage in pregnant patient 2021     Priority: Medium     Need for Tdap vaccination 2021     Priority: Medium     Dysplasia of cervix, high grade ISMAEL 2 2011     Priority: Medium     11 pap ASCUS + HPV 16 (high risk)  11 colposcopy.  Biopsy= ISMAEL I.  Recommend repeat pap in 4-6 months.  11 pap LSIL. Plan-- pap in 6 months. (Due 3/2012) reminder placed.  12 pap-- NIL. Plan--If pap is normal or LGSIL the repeat in 6 months (pap due on or about 12)  12 pap NIL. Plan--Repeat pap at [facility] in 6 months. (due 13)  13 pap NIL. Plan--routine screening  16 NIL pap.   19: ASC-H Pap, + HR HPV (Not 16 or 18) result. Plan [place].   19 [place] bx and ECC: WNL. Plan: cotest 1 year  10/15/20: ASCUS Pap, + HR HPV (not 16 or 18). Plan [place] due bef 01/15/21.   10/22/20:Pt was advised.  20: [place] Bx ISMAEL 2, ECC benign. Plan LEEP now or [place] and cotest in 6 months. Provider sent the results and recommendations to the pt thru [facility]. Pt viewed. Awaiting pt's response.    20 Pt has decided to schedule LEEP.  Extended next outreach to give patient time to schedule  2/11/21 Pt newly pregnant, currently 7w2d. Enc sent to provider for alternate plan.  3/11/21 New OB. Estimated Date of Delivery: Sep 28, 2021. ISMAEL 2 on last colp in 11/2020. Was planning LEEP then conceived. Plan colp next visit.   4/16/21 [place] Bx's Neg for dysplasia. NIL Pap, + HR HPV (not 16 or 18),  pregnant pt, Plan cotest postpartum EDC 09/28/21.  Provider released the results and recommendations to the pt through [employer], pt viewed.          LSIL (low grade squamous intraepithelial lesion) on Pap smear 05/03/2011     Priority: Medium     Formatting of this note might be different from the original.  Overview:   4/7/11 pap ASCUS + HPV 16 (high risk)  4/26/11 colposcopy.  Biopsy= ISMAEL I.  Recommend repeat pap in 4-6 months.  9/16/11 pap LSIL. Plan-- pap in 6 months. (Due 3/2012) reminder placed.  5/8/12 pap-- NIL. Plan--If pap is normal or LGSIL the repeat in 6 months (pap due on or about 11/8/12)  12/18/12 pap NIL. Plan--Repeat pap at [facility] in 6 months. (due 6/18/13) reminder placed.  6/18/13 pap NIL. Plan--routine screening  9/6/16 NIL pap.       CARDIOVASCULAR SCREENING; LDL GOAL LESS THAN 160 04/07/2011     Priority: Medium         Recent Labs   Lab Test 03/11/21  0857   ABO A   RH Pos   AS Neg     Rhogam not indicated   Recent Labs   Lab Test 03/11/21  0857   HEPBANG Nonreactive   HIAGAB Nonreactive   RUQIGG 74       Past Medical History    I have reviewed this patient's medical history and updated it with pertinent information if needed.   Past Medical History:   Diagnosis Date     Abnormal Pap smear of cervix 09/19/2019 09/119/19, 10/15/20     ASCUS on Pap smear 04/07/2011     Cervical high risk HPV (human papillomavirus) test positive 04/07/2011 04/07/11, 10/15/20, 04/16/21     History of colposcopy with cervical biopsy 04/26/2011    ISMAEL 1     LSIL (low grade squamous intraepithelial lesion) on Pap smear 09/16/2011     Poison ivy dermatitis         Past Surgical History   I have reviewed this patient's surgical history and updated it with pertinent information if needed.  Past Surgical History:   Procedure Laterality Date     COLPOSCOPY  11/21/2019    see problem list     EYE SURGERY  2015    lasix       Prior to Admission Medications   Prior to Admission Medications   Prescriptions Last Dose Informant Patient Reported? Taking?   Calcium Carbonate Antacid (TUMS PO) Past Week at Unknown time  Yes Yes   Prenatal Vit-Fe Fumarate-FA (PRENATAL VITAMIN PO) 8/18/2021 at Unknown time  Yes Yes   aspirin 81 MG EC tablet 8/17/2021 at Unknown time  Yes Yes   Sig: Take 81 mg by mouth daily   diphenhydrAMINE (BENADRYL) 25 MG tablet   Yes No   Sig: Take 25 mg by mouth every 6 hours as needed.   Patient not taking: Reported on 8/12/2021      Facility-Administered Medications: None     Allergies   Allergies   Allergen Reactions     Seasonal Allergies        Social History   I have reviewed this patient's social history and updated it with pertinent information if needed. Joselyn Flynn  reports that she has never smoked. She has never used smokeless tobacco. She reports previous alcohol use. She reports that she does not use drugs.    Family History   I have reviewed this patient's family history and updated it with pertinent information if needed.   Family History   Problem Relation Age of Onset     Pancreatic Cancer Maternal Uncle      Breast Cancer Paternal Aunt        Immunization History   Immunizations are up to date    Physical Exam   Temp: 98.9  F (37.2  C) Temp src: Oral BP: 120/69     Resp: 18        Vital Signs with Ranges  Temp:  [98.9  F (37.2  C)] 98.9  F (37.2  C)  Resp:  [18] 18  BP: (120)/(69) 120/69    Abdomen: gravid, single vertex fetus, non-tender, EFW 2455 g  Cervical Exam: deferred    Fetal Heart Tones: 130 baseline, moderate variablility, + accels, no decels and Category I  TOCO:   frequency q 0 minutes    Constitutional: healthy, alert and  active   Respiratory: no increased work of breathing  Cardiovascular: no edema  Skin/Extremites: normal skin color, texture, turgor  Neurologic: Awake, alert, oriented to name, place and time.    Neuropsychiatric: General: normal, calm and normal eye contact    Obstetrical Ultrasound Report:  limited follow up growth     Exam date: 8/18/2021  HISTORY: Size discrepancy  Stated EDC: 9/28/2021 based on LMP and 9/26/2021 by first ultrasound     COMPARISON: Ultrasound 5/19/2021     FINDINGS:  There is a single living intrauterine fetus.       Biometry:  BPD:   9.3 cm, 37 weeks and 6 days, greater than 98 percent  HC:     31.0 cm, 34 weeks and 5 days, 26 percent  AC:     30.5 cm, 34 weeks and 4 days, 63 percent  FL:      6.5 cm, 33 weeks and 4 days, 25 percent  HC/AC ratio:  1.02 normal range 0.96-1.11     Estimated fetal weight:  2455 grams, 68 percentile  Fetal Heart Rate: 181 beats per minute. Normal rate and rhythm  Fetal Presentation: Cephalic  Placental location: Anterior,  no evidence of placenta previa  Amniotic fluid volume: normal, AYESHA was not calculated.     Ultrasound gestational age based on today's examination: 35 weeks and  2 days  Ultrasound KAREN based on today's examination: 9/20/2021                                                                      IMPRESSION:  1.  There is a single living intrauterine fetus.    2.  Based on today's examination, the ultrasound gestational age is 35  weeks and 2 days. EFW by today's ultrasound is 2455 grams, which is  the 68 percentile.  3.  Cephalic fetal lie. Closed cervix. No placenta previa.  4. Borderline fetal tachycardia.     I have personally reviewed the examination and initial interpretation  and I agree with the findings.     EVELYNE CLAUDIO MD

## 2021-08-19 NOTE — PROGRESS NOTES
Data: Patient presented to the [facility] from home at .   Reason for maternal/fetal assessment per patient is Non Stress Test  requested by Dr. Venegas following US today . Patient is a . Prenatal record reviewed.      OB History    Para Term  AB Living   1 0 0 0 0 0   SAB TAB Ectopic Multiple Live Births   0 0 0 0 0      # Outcome Date GA Lbr Rolando/2nd Weight Sex Delivery Anes PTL Lv   1 Current               Medical History:   Past Medical History:   Diagnosis Date     Abnormal Pap smear of cervix 2019, 10/15/20     ASCUS on Pap smear 2011     Cervical high risk HPV (human papillomavirus) test positive 2011, 10/15/20, 21     History of colposcopy with cervical biopsy 2011    ISMAEL 1     LSIL (low grade squamous intraepithelial lesion) on Pap smear 2011     Poison ivy dermatitis    Gestational Age 34w1d. VSS. Cervix: not examined.  Fetal movement present. Patient reports feeling occasional Deric Villatoro this week, not painful. She denies cramping, backache, vaginal discharge, pelvic pressure, UTI symptoms, GI problems, bloody show, vaginal bleeding, edema, headache, visual disturbances, epigastric or URQ pain, abdominal pain, rupture of membranes. Support person, , Jayro is present.  Action: Verbal consent for EFM. Triage assessment completed. EFM applied for fetal-wellbeing assessment. FHR 135s, moderate variability, accels present, no decels. Uterine assessment irritability noted, pt not feeling. Fetal assessment: Presumed adequate fetal oxygenation documented (see flow record). Patient education pamphlets given on fetal movement counts. Patient instructed to report change in fetal movement, vaginal leaking of fluid or bleeding, abdominal pain, or any concerns related to the pregnancy to her nurse/physician.   Response: Dr. Venegas informed of patient arrival. NST reactive, reassuring and appropriate for gestational age. Pt  with questions regarding appropriate sleep aids, provider recommending benadryl vs. Melatonin. Plan per provider is discharge home. Patient with scheduled OB visit 08/24/2021. Patient verbalized understanding of education and verbalized agreement with plan. Discharged ambulatory at 2156.

## 2021-08-19 NOTE — PROVIDER NOTIFICATION
08/18/21 2139   Provider Notification   Provider Name/Title REHANA Parks   Method of Notification In Department   Notification Reason Patient Arrived     Patient arrived from home for NST following US early today. Pt contacted directly by Dr. Venegas to come to [facility]. Triage assessment completed. EFM applied. Pt accompanied by spouse, Jayro. Dr. Venegas aware of patient arrival.

## 2021-08-19 NOTE — DISCHARGE INSTRUCTIONS
Discharge Instructions for Undelivered Patients    Diet:  * Drink 8 to 12 glasses of liquids (milk, juice, water) every day  * You may eat meals and snacks.    Activity:  * Count fetal kicks every day (see handout).  * Call your doctor or nurse midwife if your baby is moving less than usual.    Call your provider if you notice:  * Swelling in your face or increased swelling in your hands or legs.  * Headaches that are not relieved by Tylenol (acetaminophen).  * Changes in your vision (blurring; seeing spots or stars).  * Nausea (sick to your stomach) and vomiting (throwing up).  * Weight gain of 5 pounds per week.  * Heartburn that doesn't go away.  * Signs of bladder infection: Pain when you urinate (use the toilet), needing to go more often or more urgently.  * The bag of fernandez (membrane) breaks, or you notice leaking in your underwear.  * Bright red blood in your underwear.  * Abdominal (lower belly) or stomach pain.  * For first baby: Contractions (tightenings) less than 5 minutes apart for one hour or more.  * Increase or change in vaginal discharge (note the color and amount).

## 2021-08-24 ENCOUNTER — PRENATAL OFFICE VISIT (OUTPATIENT)
Dept: OBGYN | Facility: CLINIC | Age: 36
End: 2021-08-24
Payer: COMMERCIAL

## 2021-08-24 VITALS
WEIGHT: 158 LBS | BODY MASS INDEX: 25.7 KG/M2 | DIASTOLIC BLOOD PRESSURE: 76 MMHG | HEART RATE: 82 BPM | SYSTOLIC BLOOD PRESSURE: 125 MMHG | TEMPERATURE: 98.6 F

## 2021-08-24 DIAGNOSIS — Z34.03 ENCOUNTER FOR SUPERVISION OF NORMAL FIRST PREGNANCY IN THIRD TRIMESTER: Primary | ICD-10-CM

## 2021-08-24 PROCEDURE — 99207 PR PRENATAL VISIT: CPT | Performed by: OBSTETRICS & GYNECOLOGY

## 2021-08-24 NOTE — PROGRESS NOTES
35w0d   No complaints.  Baby is moving well. Feels a lot of mild B-H ctx's. Sometimes sharp pain in the bladder but no dysuria.   discussed GBS and hgb at 36 wks.  RR

## 2021-08-30 ENCOUNTER — MEDICAL CORRESPONDENCE (OUTPATIENT)
Dept: HEALTH INFORMATION MANAGEMENT | Facility: CLINIC | Age: 36
End: 2021-08-30

## 2021-09-05 ENCOUNTER — HEALTH MAINTENANCE LETTER (OUTPATIENT)
Age: 36
End: 2021-09-05

## 2021-09-09 ENCOUNTER — PRENATAL OFFICE VISIT (OUTPATIENT)
Dept: OBGYN | Facility: CLINIC | Age: 36
End: 2021-09-09
Payer: COMMERCIAL

## 2021-09-09 VITALS
TEMPERATURE: 98.3 F | WEIGHT: 161 LBS | SYSTOLIC BLOOD PRESSURE: 131 MMHG | DIASTOLIC BLOOD PRESSURE: 82 MMHG | HEART RATE: 91 BPM | BODY MASS INDEX: 26.18 KG/M2

## 2021-09-09 DIAGNOSIS — Z34.03 ENCOUNTER FOR SUPERVISION OF NORMAL FIRST PREGNANCY IN THIRD TRIMESTER: Primary | ICD-10-CM

## 2021-09-09 LAB — HGB BLD-MCNC: 11.6 G/DL (ref 11.7–15.7)

## 2021-09-09 PROCEDURE — 87653 STREP B DNA AMP PROBE: CPT | Performed by: OBSTETRICS & GYNECOLOGY

## 2021-09-09 PROCEDURE — 99207 PR PRENATAL VISIT: CPT | Performed by: OBSTETRICS & GYNECOLOGY

## 2021-09-09 PROCEDURE — 36415 COLL VENOUS BLD VENIPUNCTURE: CPT | Performed by: OBSTETRICS & GYNECOLOGY

## 2021-09-10 LAB
GP B STREP DNA SPEC QL NAA+PROBE: POSITIVE
PATIENT PENICILLIN, AMOXICILLIN, CEPHALOSPORINS ALLERGY: NO

## 2021-09-11 ENCOUNTER — NURSE TRIAGE (OUTPATIENT)
Dept: NURSING | Facility: CLINIC | Age: 36
End: 2021-09-11

## 2021-09-11 ENCOUNTER — HOSPITAL ENCOUNTER (OUTPATIENT)
Facility: CLINIC | Age: 36
Discharge: HOME | End: 2021-09-11
Attending: OBSTETRICS & GYNECOLOGY | Admitting: OBSTETRICS & GYNECOLOGY
Payer: COMMERCIAL

## 2021-09-11 ENCOUNTER — HOSPITAL ENCOUNTER (OUTPATIENT)
Facility: CLINIC | Age: 36
End: 2021-09-11
Admitting: OBSTETRICS & GYNECOLOGY
Payer: COMMERCIAL

## 2021-09-11 VITALS
HEART RATE: 93 BPM | RESPIRATION RATE: 16 BRPM | SYSTOLIC BLOOD PRESSURE: 122 MMHG | DIASTOLIC BLOOD PRESSURE: 83 MMHG | TEMPERATURE: 98.5 F

## 2021-09-11 PROBLEM — O99.820 GBS (GROUP B STREPTOCOCCUS CARRIER), +RV CULTURE, CURRENTLY PREGNANT: Status: ACTIVE | Noted: 2021-09-11

## 2021-09-11 LAB — RUPTURE OF FETAL MEMBRANES BY ROM PLUS: NEGATIVE

## 2021-09-11 PROCEDURE — G0463 HOSPITAL OUTPT CLINIC VISIT: HCPCS

## 2021-09-11 PROCEDURE — 84112 EVAL AMNIOTIC FLUID PROTEIN: CPT | Performed by: OBSTETRICS & GYNECOLOGY

## 2021-09-11 PROCEDURE — 59025 FETAL NON-STRESS TEST: CPT | Mod: 26 | Performed by: OBSTETRICS & GYNECOLOGY

## 2021-09-11 PROCEDURE — 99213 OFFICE O/P EST LOW 20 MIN: CPT | Mod: 25 | Performed by: OBSTETRICS & GYNECOLOGY

## 2021-09-11 RX ORDER — LIDOCAINE 40 MG/G
CREAM TOPICAL
Status: DISCONTINUED | OUTPATIENT
Start: 2021-09-11 | End: 2021-09-11 | Stop reason: HOSPADM

## 2021-09-11 ASSESSMENT — ACTIVITIES OF DAILY LIVING (ADL)
TOILETING_ISSUES: NO
FALL_HISTORY_WITHIN_LAST_SIX_MONTHS: NO

## 2021-09-11 NOTE — PROVIDER NOTIFICATION
09/11/21 0908   Provider Notification   Provider Name/Title Dr Kulkarni    Method of Notification Electronic Page   Notification Reason Status Update  (ROM+ was negative)   Pt's ROM+ was negative

## 2021-09-11 NOTE — PROVIDER NOTIFICATION
21 0814   Provider Notification   Provider Name/Title Dr Kulkarni   Method of Notification Electronic Page   Request Evaluate in Person   Notification Reason Patient Arrived;Membrane Status    37.4 Here for r/o srom.  At 0630 had gush of fluid w intercourse.  +GBS.  rom plus collected and sent. No vag bleeding. No leaking since.  Not feeling ctxs.  +FM.

## 2021-09-11 NOTE — PROGRESS NOTES
[facility]  OB Triage Note    CC: leakage of fluid    HPI: Ms. Joselyn Flynn is a 35 year old  at 37w4d by LMP c/w 11w3d US, who presents with concern of leakage of fluid. She says that at the time of orgasm, she noted a gush of fluid. She is unsure if it was urine. She has not had any continued leakage of fluid. She denies contractions and vaginal bleeding.  + Good fetal movement. Denies headaches, vision changes, chest pain, SOB, nausea, vomiting, fevers chills, dysuria, foul-odor discharge.    Obstetric Complications  - none    O:  Patient Vitals for the past 24 hrs:   BP Temp Temp src Pulse Resp   21 0829 -- 98.5  F (36.9  C) Oral -- --   21 0814 122/83 -- -- 93 16     Gen: Well-appearing, NAD  CV: Well perfused, normal rate  Pulm: Normal rate and effort  Abd: Soft, non-tender, non-distended  Ext: no LE edema b/l    Speculum: normal genitalia, white physiological discharge present, no leakage of fluid with valsalva  Cervix: Closed, soft    NST:  FHT: Baseline 145, moderate variability, + accels, - decels  [place]: 0-2 ctx in 10 mins    BSUS: MVP 4.63 cm    A/P:  Ms. Joselyn Flynn is a 35 year old  at 37w4d by LMP c/w 11w3d US, who presents for rule out rupture of membranes. FHT is reactive and reassuring. ROM plus negative. BSUS with normal MVP and physical exam reassuring. Do not suspect ROM or labor at this time. Discharge home with precautions. .    Staffed with Dr. Rubi Rogers MD  OB/GYN PGY-3  21 10:08 AM    Physician Attestation   I, Kimberly Venegas MD, personally examined and evaluated this patient.  I discussed the patient with the resident/fellow and care team, and agree with the assessment and plan of care as documented in the note of 21.      I personally reviewed vital signs, medications, labs and NST.    Key findings: NST reactive. No objective evidence of rupture of membranes. Clinic appointment this week.    Kimberly Venegas MD  Date of Service (when I saw the patient): 09/11/21

## 2021-09-11 NOTE — DISCHARGE INSTRUCTIONS
Discharge Instruction for Undelivered Patients      You were seen for: Membrane Assessment  We Consulted: [facility] MDs  You had (Test or Medicine):ROM+     Diet:   Drink 8 to 12 glasses of liquids (milk, juice, water) every day.  You may eat meals and snacks.     Activity:  Call your doctor or nurse midwife if your baby is moving less than usual.     Call your provider if you notice:  Swelling in your face or increased swelling in your hands or legs.  Headaches that are not relieved by Tylenol (acetaminophen).  Changes in your vision (blurring: seeing spots or stars.)  Nausea (sick to your stomach) and vomiting (throwing up).   Weight gain of 5 pounds or more per week.  Heartburn that doesn't go away.  Signs of bladder infection: pain when you urinate (use the toilet), need to go more often and more urgently.  The bag of fernandez (rupture of membranes) breaks, or you notice leaking in your underwear.  Bright red blood in your underwear.  Abdominal (lower belly) or stomach pain.  For first baby: Contractions (tightening) less than 5 minutes apart for one hour or more.  Second (plus) baby: Contractions (tightening) less than 10 minutes apart and getting stronger.  *If less than 34 weeks: Contractions (tightening) more than 6 times in one hour.  Increase or change in vaginal discharge (note the color and amount)  Other:     Follow-up:  As scheduled in the clinic

## 2021-09-11 NOTE — PROVIDER NOTIFICATION
09/11/21 0934   Provider Notification   Provider Name/Title Dr Kulkarni   Method of Notification At Bedside   Notification Reason Other (Comment)   Pt okay to be taken off FHT monitor, baby reactive. Provider did bedside ultrasound and speculum exam with no further evidence of SROM. Patient will be discharged to home.

## 2021-09-11 NOTE — TELEPHONE ENCOUNTER
"OB Triage Call      Is patient's OB/Midwife with the formerly [facility] or [facility]? [facility]- Proceed with triage     Reason for call: Rapture of Membrane    Assessment:  Pt called and reported have half an hour ago during orgasm her water broke. She reported there was a gush of clear water that came out that wet the bed. Pt denied contractions or bleeding. Pt reported this is her first baby.    Plan: Per prototcal pt was advised to go to Labor and deliver at [facility]. RN called L&D, spoke with Juli and she was informed about the pt arrival and she stated pt can came in. RN called Pt back and informed her L&D was notified and they are waiting your araval, pt stated okay.        Patient plans to deliver at [place]    Patient's primary OB Provider is  Kimberly Venegas MD          37w4d    Estimated Date of Delivery: Sep 28, 2021        OB History    Para Term  AB Living   1 0 0 0 0 0   SAB TAB Ectopic Multiple Live Births   0 0 0 0 0      # Outcome Date GA Lbr Rolando/2nd Weight Sex Delivery Anes PTL Lv   1 Current                No results found for: GBS       Amino S LEVI Murguia 21 7:19 AM  [facility] Nurse Advisor          Reason for Disposition    Leakage of fluid from vagina    Additional Information    Negative: SEVERE constant abdominal pain (e.g., excruciating)    Negative: SEVERE bleeding (e.g., continuous red blood from vagina, or large blood clots)    Negative: Umbilical cord hanging out of the vagina (shiny, white, curled appearance, \"like telephone cord\")    Negative: Uncontrollable urge to push (i.e., feels like baby is coming out now)    Negative: Can see baby    Negative: Sounds like a life-threatening emergency to the triager    Negative: < 20 weeks pregnant    Negative: Vaginal bleeding    Protocols used: PREGNANCY - RUPTURE OF [id number]      "

## 2021-09-11 NOTE — PROVIDER NOTIFICATION
"   09/11/21 0845   Provider Notification   Provider Name/Title Dr Kulkarni   Method of Notification Electronic Page   pt is having ctxs q 3-4\"  Not feeling.   with accels. +GBS.  rom plus results pending.  "

## 2021-09-11 NOTE — PLAN OF CARE
Data: [facility] 0[facility].   Reason for maternal/fetal assessment per patient is Rule out rupture of membranes  . Patient is a . Prenatal record reviewed.      OB History    Para Term  AB Living   1 0 0 0 0 0   SAB TAB Ectopic Multiple Live Births   0 0 0 0 0      # Outcome Date GA Lbr Rolando/2nd Weight Sex Delivery Anes PTL Lv   1 Current               Medical History:   Past Medical History:   Diagnosis Date    Abnormal Pap smear of cervix 2019, 10/15/20    ASCUS on Pap smear 2011    Cervical high risk HPV (human papillomavirus) test positive 2011, 10/15/20, 21    History of colposcopy with cervical biopsy 2011    ISMAEL 1    LSIL (low grade squamous intraepithelial lesion) on Pap smear 2011    Poison ivy dermatitis    . Gestational Age 37w4d. VSS. Cervix: closed.  Fetal movement present. Patient denies cramping, backache, pelvic pressure, UTI symptoms, GI problems, bloody show, vaginal bleeding, edema, headache, visual disturbances, epigastric or URQ pain, abdominal pain, rupture of membranes. Support persons not present.  Action: Verbal consent for EFM. Triage assessment completed. EFM applied for fetal well being. Uterine assessment shows irritability and contractions every 2-5 mins. Fetal assessment: Presumed adequate fetal oxygenation documented (see flow record). Patient education pamphlets given on discharge instructions and when to call provider. Patient instructed to report change in fetal movement, vaginal leaking of fluid or bleeding, abdominal pain, or any concerns related to the pregnancy to her nurse/physician.   Response: Dr. Rubi Kulkarni informed of patient arrival and assessment. Plan per provider is to discharge patient to home. Patient verbalized understanding of education and verbalized agreement with plan. Discharged ambulatory to home.

## 2021-09-14 ENCOUNTER — PRENATAL OFFICE VISIT (OUTPATIENT)
Dept: OBGYN | Facility: CLINIC | Age: 36
End: 2021-09-14
Payer: COMMERCIAL

## 2021-09-14 VITALS
HEIGHT: 66 IN | DIASTOLIC BLOOD PRESSURE: 84 MMHG | BODY MASS INDEX: 26.2 KG/M2 | HEART RATE: 90 BPM | WEIGHT: 163 LBS | SYSTOLIC BLOOD PRESSURE: 122 MMHG

## 2021-09-14 DIAGNOSIS — Z34.03 ENCOUNTER FOR SUPERVISION OF NORMAL FIRST PREGNANCY IN THIRD TRIMESTER: Primary | ICD-10-CM

## 2021-09-14 PROCEDURE — 99207 PR PRENATAL VISIT: CPT | Performed by: OBSTETRICS & GYNECOLOGY

## 2021-09-14 RX ORDER — ONDANSETRON 2 MG/ML
4 INJECTION INTRAMUSCULAR; INTRAVENOUS EVERY 6 HOURS PRN
Status: CANCELLED | OUTPATIENT
Start: 2021-09-14

## 2021-09-14 RX ORDER — KETOROLAC TROMETHAMINE 30 MG/ML
30 INJECTION, SOLUTION INTRAMUSCULAR; INTRAVENOUS
Status: CANCELLED | OUTPATIENT
Start: 2021-09-14 | End: 2021-09-19

## 2021-09-14 RX ORDER — MISOPROSTOL 200 UG/1
800 TABLET ORAL
Status: CANCELLED | OUTPATIENT
Start: 2021-09-14

## 2021-09-14 RX ORDER — OXYTOCIN 10 [USP'U]/ML
10 INJECTION, SOLUTION INTRAMUSCULAR; INTRAVENOUS
Status: CANCELLED | OUTPATIENT
Start: 2021-09-14

## 2021-09-14 RX ORDER — IBUPROFEN 600 MG/1
600 TABLET, FILM COATED ORAL EVERY 6 HOURS PRN
COMMUNITY
Start: 2021-09-14 | End: 2021-10-14

## 2021-09-14 RX ORDER — SODIUM CHLORIDE, SODIUM LACTATE, POTASSIUM CHLORIDE, CALCIUM CHLORIDE 600; 310; 30; 20 MG/100ML; MG/100ML; MG/100ML; MG/100ML
INJECTION, SOLUTION INTRAVENOUS CONTINUOUS
Status: CANCELLED | OUTPATIENT
Start: 2021-09-14

## 2021-09-14 RX ORDER — OXYTOCIN/0.9 % SODIUM CHLORIDE 30/500 ML
100-340 PLASTIC BAG, INJECTION (ML) INTRAVENOUS CONTINUOUS PRN
Status: CANCELLED | OUTPATIENT
Start: 2021-09-14

## 2021-09-14 RX ORDER — IBUPROFEN 200 MG
600 TABLET ORAL
Status: CANCELLED | OUTPATIENT
Start: 2021-09-14 | End: 2021-09-19

## 2021-09-14 RX ORDER — NALOXONE HYDROCHLORIDE 0.4 MG/ML
0.2 INJECTION, SOLUTION INTRAMUSCULAR; INTRAVENOUS; SUBCUTANEOUS
Status: CANCELLED | OUTPATIENT
Start: 2021-09-14

## 2021-09-14 RX ORDER — LIDOCAINE 40 MG/G
CREAM TOPICAL
Status: CANCELLED | OUTPATIENT
Start: 2021-09-14

## 2021-09-14 RX ORDER — ACETAMINOPHEN 325 MG/1
650 TABLET ORAL EVERY 4 HOURS PRN
Status: CANCELLED | OUTPATIENT
Start: 2021-09-14

## 2021-09-14 RX ORDER — METOCLOPRAMIDE HYDROCHLORIDE 5 MG/ML
10 INJECTION INTRAMUSCULAR; INTRAVENOUS EVERY 6 HOURS PRN
Status: CANCELLED | OUTPATIENT
Start: 2021-09-14

## 2021-09-14 RX ORDER — NALOXONE HYDROCHLORIDE 0.4 MG/ML
0.4 INJECTION, SOLUTION INTRAMUSCULAR; INTRAVENOUS; SUBCUTANEOUS
Status: CANCELLED | OUTPATIENT
Start: 2021-09-14

## 2021-09-14 RX ORDER — ACETAMINOPHEN 325 MG/1
650 TABLET ORAL EVERY 6 HOURS PRN
COMMUNITY
Start: 2021-09-14 | End: 2021-10-14

## 2021-09-14 RX ORDER — ONDANSETRON 4 MG/1
4 TABLET, ORALLY DISINTEGRATING ORAL EVERY 6 HOURS PRN
Status: CANCELLED | OUTPATIENT
Start: 2021-09-14

## 2021-09-14 RX ORDER — PROCHLORPERAZINE 25 MG
25 SUPPOSITORY, RECTAL RECTAL EVERY 12 HOURS PRN
Status: CANCELLED | OUTPATIENT
Start: 2021-09-14

## 2021-09-14 RX ORDER — MISOPROSTOL 100 UG/1
400 TABLET ORAL
Status: CANCELLED | OUTPATIENT
Start: 2021-09-14

## 2021-09-14 RX ORDER — METOCLOPRAMIDE 5 MG/1
10 TABLET ORAL EVERY 6 HOURS PRN
Status: CANCELLED | OUTPATIENT
Start: 2021-09-14

## 2021-09-14 RX ORDER — METHYLERGONOVINE MALEATE 0.2 MG/ML
200 INJECTION INTRAVENOUS
Status: CANCELLED | OUTPATIENT
Start: 2021-09-14

## 2021-09-14 RX ORDER — FENTANYL CITRATE 50 UG/ML
50-100 INJECTION, SOLUTION INTRAMUSCULAR; INTRAVENOUS
Status: CANCELLED | OUTPATIENT
Start: 2021-09-14

## 2021-09-14 RX ORDER — OXYTOCIN/0.9 % SODIUM CHLORIDE 30/500 ML
340 PLASTIC BAG, INJECTION (ML) INTRAVENOUS CONTINUOUS PRN
Status: CANCELLED | OUTPATIENT
Start: 2021-09-14

## 2021-09-14 RX ORDER — CARBOPROST TROMETHAMINE 250 UG/ML
250 INJECTION, SOLUTION INTRAMUSCULAR
Status: CANCELLED | OUTPATIENT
Start: 2021-09-14

## 2021-09-14 RX ORDER — PROCHLORPERAZINE MALEATE 5 MG
10 TABLET ORAL EVERY 6 HOURS PRN
Status: CANCELLED | OUTPATIENT
Start: 2021-09-14

## 2021-09-14 RX ORDER — AMOXICILLIN 250 MG
1 CAPSULE ORAL DAILY
COMMUNITY
Start: 2021-09-14 | End: 2021-10-14

## 2021-09-14 ASSESSMENT — MIFFLIN-ST. JEOR: SCORE: 1456.11

## 2021-09-14 NOTE — PROGRESS NOTES
GBS: Positive  Hemoglobin   Date Value Ref Range Status   09/09/2021 11.6 (L) 11.7 - 15.7 g/dL Final   06/17/2021 11.5 (L) 11.7 - 15.7 g/dL Final   ]    Breast pump rx: has already  Labor orders: signed and held  Birth plan: probably epidural  Length of stay: discussed  Disability paperwork: NA  Resident involvement: discussed and agrees.  PP meds OTC.     Good fetal movement.   No contractions. Last day of work tomorrow  RTC weekly

## 2021-09-23 ENCOUNTER — PRENATAL OFFICE VISIT (OUTPATIENT)
Dept: OBGYN | Facility: CLINIC | Age: 36
End: 2021-09-23
Payer: COMMERCIAL

## 2021-09-23 VITALS
SYSTOLIC BLOOD PRESSURE: 127 MMHG | BODY MASS INDEX: 26.63 KG/M2 | HEART RATE: 89 BPM | TEMPERATURE: 97.4 F | DIASTOLIC BLOOD PRESSURE: 77 MMHG | WEIGHT: 165 LBS

## 2021-09-23 DIAGNOSIS — Z34.03 ENCOUNTER FOR SUPERVISION OF NORMAL FIRST PREGNANCY IN THIRD TRIMESTER: Primary | ICD-10-CM

## 2021-09-23 PROCEDURE — 99207 PR PRENATAL VISIT: CPT | Performed by: OBSTETRICS & GYNECOLOGY

## 2021-09-23 NOTE — PROGRESS NOTES
Doing well.   Good fetal movement.   Sporadic contractions.   RTC one week - bedside MFP, NST then. COVID test, schedule IOL if needed.

## 2021-09-29 ENCOUNTER — PRENATAL OFFICE VISIT (OUTPATIENT)
Dept: OBGYN | Facility: CLINIC | Age: 36
End: 2021-09-29
Payer: COMMERCIAL

## 2021-09-29 VITALS
SYSTOLIC BLOOD PRESSURE: 125 MMHG | DIASTOLIC BLOOD PRESSURE: 74 MMHG | BODY MASS INDEX: 26.47 KG/M2 | TEMPERATURE: 98.1 F | WEIGHT: 164 LBS | HEART RATE: 80 BPM

## 2021-09-29 DIAGNOSIS — O48.0 POST-TERM PREGNANCY, 40-42 WEEKS OF GESTATION: Primary | ICD-10-CM

## 2021-09-29 DIAGNOSIS — Z11.59 SCREENING FOR VIRAL DISEASE: ICD-10-CM

## 2021-09-29 PROCEDURE — 99207 PR PRENATAL VISIT: CPT | Performed by: OBSTETRICS & GYNECOLOGY

## 2021-09-29 PROCEDURE — 59426 ANTEPARTUM CARE ONLY: CPT | Performed by: OBSTETRICS & GYNECOLOGY

## 2021-09-29 PROCEDURE — 59025 FETAL NON-STRESS TEST: CPT | Performed by: OBSTETRICS & GYNECOLOGY

## 2021-09-29 NOTE — PROGRESS NOTES
Doing well.   Good fetal movement.   Intermittent contractions.   Cervix favorable. Anticipate labor, but IOL scheduled for 10/5 in case.   Ultrasound today for post-dates monitoring (bedside) shows MVP 3.28 cm, cephalic.   NST for post dates monitoring reactive, baseline:  140. Moderate variability. No decels. Occ accels.   Precautions reviewed.

## 2021-09-30 NOTE — TELEPHONE ENCOUNTER
4/16/21 [place] Bx's Neg for dysplasia. NIL Pap, + HR HPV (not 16 or 18),  pregnant pt, Plan cotest postpartum EDC 09/28/21.  Provider released the results and recommendations to the pt through delfina, pt viewed.   10/5/21 induction.

## 2021-10-01 ENCOUNTER — LAB (OUTPATIENT)
Dept: LAB | Facility: CLINIC | Age: 36
End: 2021-10-01
Attending: OBSTETRICS & GYNECOLOGY
Payer: COMMERCIAL

## 2021-10-01 DIAGNOSIS — Z11.59 SCREENING FOR VIRAL DISEASE: ICD-10-CM

## 2021-10-01 PROCEDURE — U0003 INFECTIOUS AGENT DETECTION BY NUCLEIC ACID (DNA OR RNA); SEVERE ACUTE RESPIRATORY SYNDROME CORONAVIRUS 2 (SARS-COV-2) (CORONAVIRUS DISEASE [COVID-19]), AMPLIFIED PROBE TECHNIQUE, MAKING USE OF HIGH THROUGHPUT TECHNOLOGIES AS DESCRIBED BY CMS-2020-01-R: HCPCS

## 2021-10-01 PROCEDURE — U0005 INFEC AGEN DETEC AMPLI PROBE: HCPCS

## 2021-10-02 LAB — SARS-COV-2 RNA RESP QL NAA+PROBE: NEGATIVE

## 2021-10-04 ENCOUNTER — HOSPITAL ENCOUNTER (OUTPATIENT)
Facility: CLINIC | Age: 36
End: 2021-10-04
Attending: OBSTETRICS & GYNECOLOGY | Admitting: OBSTETRICS & GYNECOLOGY
Payer: COMMERCIAL

## 2021-10-05 ENCOUNTER — NURSE TRIAGE (OUTPATIENT)
Dept: NURSING | Facility: CLINIC | Age: 36
End: 2021-10-05

## 2021-10-05 ENCOUNTER — TRANSFERRED RECORDS (OUTPATIENT)
Dept: HEALTH INFORMATION MANAGEMENT | Facility: CLINIC | Age: 36
End: 2021-10-05

## 2021-10-05 NOTE — TELEPHONE ENCOUNTER
Having regular contractions 6-12minutes for the last 3 hours     Pt is GBS + and has an appointment for 8am today at [place] - they are on [facility]     Per Protocol - Pt to LD now COVID 19 Nurse Triage Plan/Patient Instructions    Please be aware that novel coronavirus (COVID-19) may be circulating in the community. If you develop symptoms such as fever, cough, or SOB or if you have concerns about the presence of another infection including coronavirus (COVID-19), please contact your health care provider or visit [email or web address].     Disposition/Instructions    In-Person Visit with provider recommended. Reference Visit Selection Guide.    Thank you for taking steps to prevent the spread of this virus.  o Limit your contact with others.  o Wear a simple mask to cover your cough.  o Wash your hands well and often.    Resources    [facility]: About COVID-19: [email or web address]    CDC: What to Do If You're Sick: www.cdc.gov/coronavirus/2019-ncov/about/steps-when-sick.html    CDC: Ending Home Isolation: www.cdc.gov/coronavirus/2019-ncov/hcp/disposition-in-home-patients.html     CDC: Caring for Someone: www.cdc.gov/coronavirus/2019-ncov/if-you-are-sick/care-for-someone.html     [facility]: Interim Guidance for Hospital Discharge to Home: www.health.[facility].[place]/diseases/coronavirus/hcp/hospdischarge.pdf    [facility] clinical trials (COVID-19 research studies): [email or web address].[email or web address]-clinical-trials     Below are the COVID-19 hotlines at the [place] Department of Health ([facility]). Interpreters are available.   o For health questions: Call 838-325-6570 or 1-868.452.4704 (7 a.m. to 7 p.m.)  o For questions about schools and childcare: Call 457-102-0252 or 1-636.814.6007 (7 a.m. to 7 p.m.)                     Pt heading into LD at [place] - Charge nurse phoned and updated on pt    Mare Sampson RN  [place] Nurse Advisor  3:18 AM 10/5/2021     Reason for Disposition    [1]  "First baby (primipara) AND [2] contractions < 6 minutes apart  AND [3] present 2 hours    Additional Information    Negative: Shock suspected (e.g., cold/pale/clammy skin, too weak to stand, low BP, rapid pulse)    Negative: Passed out (i.e., lost consciousness, collapsed and was not responding)    Negative: Difficult to awaken or acting confused (e.g., disoriented, slurred speech)    Negative: [1] SEVERE abdominal pain (e.g., excruciating) AND [2] constant AND [3] present > 1 hour    Negative: Severe bleeding (e.g., continuous red blood from vagina, or large blood clots)    Negative: Umbilical cord hanging out of the vagina (shiny, white, curled appearance, \"like telephone cord\")    Negative: Uncontrollable urge to push (i.e., feels like baby is coming out now)    Negative: Can see baby    Negative: Sounds like a life-threatening emergency to the triager    Negative: Pregnant < 37 weeks (i.e., )    Negative: [1] Uncertain delivery date AND [2] possibly pregnant < 37 weeks (i.e., )    Protocols used: PREGNANCY - LABOR-A-AH      "

## 2021-10-07 ENCOUNTER — TRANSFERRED RECORDS (OUTPATIENT)
Dept: HEALTH INFORMATION MANAGEMENT | Facility: CLINIC | Age: 36
End: 2021-10-07

## 2021-12-16 ENCOUNTER — PRENATAL OFFICE VISIT (OUTPATIENT)
Dept: OBGYN | Facility: CLINIC | Age: 36
End: 2021-12-16
Payer: COMMERCIAL

## 2021-12-16 VITALS
BODY MASS INDEX: 22.6 KG/M2 | HEART RATE: 102 BPM | DIASTOLIC BLOOD PRESSURE: 75 MMHG | SYSTOLIC BLOOD PRESSURE: 128 MMHG | WEIGHT: 140 LBS | TEMPERATURE: 98.4 F

## 2021-12-16 DIAGNOSIS — N95.2 VAGINAL ATROPHY: ICD-10-CM

## 2021-12-16 DIAGNOSIS — N87.1 DYSPLASIA OF CERVIX, HIGH GRADE CIN 2: ICD-10-CM

## 2021-12-16 DIAGNOSIS — Z30.41 ORAL CONTRACEPTIVE PILL SURVEILLANCE: ICD-10-CM

## 2021-12-16 PROCEDURE — 87624 HPV HI-RISK TYP POOLED RSLT: CPT | Performed by: OBSTETRICS & GYNECOLOGY

## 2021-12-16 PROCEDURE — 88175 CYTOPATH C/V AUTO FLUID REDO: CPT | Performed by: OBSTETRICS & GYNECOLOGY

## 2021-12-16 PROCEDURE — 99207 PR POST PARTUM EXAM: CPT | Performed by: OBSTETRICS & GYNECOLOGY

## 2021-12-16 RX ORDER — SENNOSIDES A AND B 8.6 MG/1
1-2 TABLET, FILM COATED ORAL
COMMUNITY
Start: 2021-10-07 | End: 2022-07-21

## 2021-12-16 RX ORDER — CALCIUM CARBONATE 500(1250)
1 TABLET,CHEWABLE ORAL
COMMUNITY
End: 2022-07-21

## 2021-12-16 RX ORDER — VITAMIN A ACETATE, .BETA.-CAROTENE, ASCORBIC ACID, CHOLECALCIFEROL, .ALPHA.-TOCOPHEROL ACETATE, DL-, THIAMINE MONONITRATE, RIBOFLAVIN, NIACINAMIDE, PYRIDOXINE HYDROCHLORIDE, FOLIC ACID, CYANOCOBALAMIN, CALCIUM CARBONATE, FERROUS FUMARATE, ZINC OXIDE, AND CUPRIC OXIDE 2000; 2000; 120; 400; 22; 1.84; 3; 20; 10; 1; 12; 200; 27; 25; 2 [IU]/1; [IU]/1; MG/1; [IU]/1; MG/1; MG/1; MG/1; MG/1; MG/1; MG/1; UG/1; MG/1; MG/1; MG/1; MG/1
1 TABLET ORAL
COMMUNITY
End: 2022-07-21

## 2021-12-16 RX ORDER — ACETAMINOPHEN AND CODEINE PHOSPHATE 120; 12 MG/5ML; MG/5ML
0.35 SOLUTION ORAL DAILY
Qty: 84 TABLET | Refills: 3 | Status: SHIPPED | OUTPATIENT
Start: 2021-12-16 | End: 2022-11-18

## 2021-12-16 RX ORDER — ESTRADIOL 0.1 MG/G
CREAM VAGINAL
Qty: 42.5 G | Refills: 3 | Status: SHIPPED | OUTPATIENT
Start: 2021-12-16 | End: 2023-06-01

## 2021-12-16 RX ORDER — IBUPROFEN 600 MG/1
600 TABLET, FILM COATED ORAL
COMMUNITY
Start: 2021-10-07 | End: 2022-07-21

## 2021-12-17 NOTE — PROGRESS NOTES
Joselyn is here for a 10-week postpartum checkup.    She had a  of a viable boy,  with third degree laceration complications.  Since delivery, she has been breast feeding.  She has no signs of infection, bleeding or other complications.  She is not pregnant.  We discussed contraception and she has chosen none.    Mood is good.  Today's Depression Rating was   PHQ-9 SCORE 2021   PHQ-9 Total Score -   PHQ-9 Total Score 0       EXAM:  Vitals:    21 1002   BP: 128/75   Pulse: 102   Temp: 98.4  F (36.9  C)   TempSrc: Oral   Weight: 63.5 kg (140 lb)     HEENT: grossly normal.  NECK: no lymphadenopathy or thyroidomegaly.  LUNGS: CTA X 2, no rales or crackles.  BREASTS: symmetric, no masses or discharge  BACK: No spinal or CVA tenderness.  HEART: RRR without murmurs clicks or gallops.  ABDOMEN: soft, non tender, good bowel sounds, without masses rebound, guarding or tenderness.      PELVIC:    External genitalia: normal without lesion, repair well healed.                            Vagina: normal mucosa and rugae, no discharge.  Cervix: multiparous, well healed, without lesion.  Uterus: non pregnant in size, firm , mobile, no lesions.  Adnexa: non tender, without masses    EXTREMITIES: Warm to touch, good pulses, no ankle edema or calf tenderness.  NEUROLOGIC: grossly normal.    ASSESSMENT:   Normal 6-week postpartum exam after .    PLAN:  Pap smear Done and mini pill / progesterone only pill for contraception.    (Z39.2) Routine postpartum follow-up  (primary encounter diagnosis)  Comment:   Plan: Still treating hemorrhoids    (N87.1) Dysplasia of cervix, high grade ISMAEL 2  Comment:   Plan: Pap imaged thin layer diagnostic with HPV         (select HPV order below)            (Z30.41) Oral contraceptive pill surveillance  Comment:   Plan: norethindrone (MICRONOR) 0.35 MG tablet            (N95.2) Vaginal atrophy  Comment:   Plan: estradiol (ESTRACE) 0.1 MG/GM vaginal cream

## 2021-12-21 LAB
BKR LAB AP GYN ADEQUACY: NORMAL
BKR LAB AP GYN INTERPRETATION: NORMAL
BKR LAB AP HPV REFLEX: NORMAL
BKR LAB AP PREVIOUS ABNORMAL: NORMAL
PATH REPORT.COMMENTS IMP SPEC: NORMAL
PATH REPORT.COMMENTS IMP SPEC: NORMAL
PATH REPORT.RELEVANT HX SPEC: NORMAL

## 2021-12-23 LAB
HUMAN PAPILLOMA VIRUS 16 DNA: NEGATIVE
HUMAN PAPILLOMA VIRUS 18 DNA: NEGATIVE
HUMAN PAPILLOMA VIRUS FINAL DIAGNOSIS: ABNORMAL
HUMAN PAPILLOMA VIRUS OTHER HR: POSITIVE

## 2021-12-27 ENCOUNTER — PATIENT OUTREACH (OUTPATIENT)
Dept: OBGYN | Facility: CLINIC | Age: 36
End: 2021-12-27
Payer: COMMERCIAL

## 2022-06-02 ENCOUNTER — PATIENT OUTREACH (OUTPATIENT)
Dept: FAMILY MEDICINE | Facility: CLINIC | Age: 37
End: 2022-06-02

## 2022-06-02 DIAGNOSIS — N87.1 DYSPLASIA OF CERVIX, HIGH GRADE CIN 2: ICD-10-CM

## 2022-07-21 ENCOUNTER — OFFICE VISIT (OUTPATIENT)
Dept: OBGYN | Facility: CLINIC | Age: 37
End: 2022-07-21
Payer: MEDICAID

## 2022-07-21 VITALS
SYSTOLIC BLOOD PRESSURE: 109 MMHG | HEART RATE: 67 BPM | DIASTOLIC BLOOD PRESSURE: 73 MMHG | TEMPERATURE: 97.8 F | BODY MASS INDEX: 20.12 KG/M2 | WEIGHT: 125.2 LBS | HEIGHT: 66 IN

## 2022-07-21 DIAGNOSIS — N87.1 DYSPLASIA OF CERVIX, HIGH GRADE CIN 2: Primary | ICD-10-CM

## 2022-07-21 PROCEDURE — 99212 OFFICE O/P EST SF 10 MIN: CPT | Performed by: OBSTETRICS & GYNECOLOGY

## 2022-07-21 PROCEDURE — 87624 HPV HI-RISK TYP POOLED RSLT: CPT | Performed by: OBSTETRICS & GYNECOLOGY

## 2022-07-21 PROCEDURE — 88175 CYTOPATH C/V AUTO FLUID REDO: CPT | Performed by: OBSTETRICS & GYNECOLOGY

## 2022-07-21 ASSESSMENT — PATIENT HEALTH QUESTIONNAIRE - PHQ9: SUM OF ALL RESPONSES TO PHQ QUESTIONS 1-9: 2

## 2022-07-21 NOTE — PROGRESS NOTES
Assessment & Plan     Dysplasia of cervix, high grade ISMAEL 2    - Pap diagnostic with HPV    Can switch to combined OCPs after weaning  Try lubricating eye ointment    Ordering of each unique test             No follow-ups on file.    Kimberly Venegas MD  [facility]    Letitia Alves is a 36 year old, presenting for the following health issues:  Gyn Exam (Pap smear follow up)      HPI   Presents for pap  Still on POPs, still breastfeeding.   Having some dry eyes  Still using estrogen cream.  Having postpartum hair loss    4/7/11 pap ASCUS + HPV 16 (high risk)  4/26/11 colposcopy.  Biopsy= ISMAEL I.  Recommend repeat pap in 4-6 months.  9/16/11 pap LSIL. Plan-- pap in 6 months. (Due 3/2012) reminder placed.  5/8/12 pap-- NIL. Plan--If pap is normal or LGSIL the repeat in 6 months (pap due on or about 11/8/12)  12/18/12 pap NIL. Plan--Repeat pap at [facility] in 6 months. (due 6/18/13)  6/18/13 pap NIL. Plan--routine screening   9/6/16 NIL pap.   09/19/19: ASC-H Pap, + HR HPV (Not 16 or 18) result. Plan [place].   11/21/19 [place] bx and ECC: WNL. Plan: cotest 1 year  10/15/20: ASCUS Pap, + HR HPV (not 16 or 18). Plan [place] due bef 01/15/21.   11/24/20: [place] Bx ISMAEL 2, ECC benign. Plan LEEP now or [place] and cotest in 6 months.   4/16/21 [place] Bx's Neg for dysplasia. NIL Pap, + HR HPV (not 16 or 18),  pregnant pt, Plan cotest postpartum EDC 09/28/21.   12/16/21 NIL pap, + HR HPV (not 16 or 18).  Plan: cotest in 6 months per provider  12/27/21 [employer] message sent. Pt read message.   6/2/22 Reminder mychart  7/21/22 pap visit      Past Medical History:   Diagnosis Date     Abnormal Pap smear of cervix 09/19/2019 09/119/19, 10/15/20     ASCUS on Pap smear 04/07/2011     Cervical high risk HPV (human papillomavirus) test positive 04/07/2011 04/07/11, 10/15/20, 04/16/21     History of colposcopy with cervical biopsy 04/26/2011    ISMAEL 1     LSIL (low grade squamous intraepithelial lesion) on Pap  smear 09/16/2011     Poison ivy dermatitis        Past Surgical History:   Procedure Laterality Date     COLPOSCOPY  11/21/2019    see problem list     EYE SURGERY  2015    lasix       Family History   Problem Relation Age of Onset     Pancreatic Cancer Maternal Uncle      Breast Cancer Paternal Aunt        Social History     Socioeconomic History     Marital status:      Spouse name: Not on file     Number of children: Not on file     Years of education: Not on file     Highest education level: Not on file   Occupational History     Employer: [employer]   Tobacco Use     Smoking status: Never Smoker     Smokeless tobacco: Never Used   Vaping Use     Vaping Use: Never used   Substance and Sexual Activity     Alcohol use: Not Currently     Comment: 1 per wk     Drug use: No     Sexual activity: Yes     Partners: Male     Birth control/protection: None   Other Topics Concern     Parent/sibling w/ CABG, MI or angioplasty before 65F 55M? No      Service No     Blood Transfusions No     Caffeine Concern No     Occupational Exposure No     Hobby Hazards No     Sleep Concern No     Stress Concern No     Weight Concern No     Special Diet No     Back Care No     Exercise Yes     Comment: occ     Bike Helmet Not Asked     Comment: doesn't ride bike outside     Seat Belt Yes     Self-Exams No   Social History Narrative     Not on file     Social Determinants of Health     Financial Resource Strain: Not on file   Food Insecurity: Not on file   Transportation Needs: Not on file   Physical Activity: Not on file   Stress: Not on file   Social Connections: Not on file   Intimate Partner Violence: Not on file   Housing Stability: Not on file       Current Outpatient Medications   Medication     Calcium Carbonate Antacid (TUMS PO)     estradiol (ESTRACE) 0.1 MG/GM vaginal cream     norethindrone (MICRONOR) 0.35 MG tablet     Prenatal Vit-Fe Fumarate-FA (PRENATAL VITAMIN PO)     witch hazel-glycerin  "(TUCKS) pad     No current facility-administered medications for this visit.          Allergies   Allergen Reactions     Seasonal Allergies          Review of Systems   Constitutional, HEENT, cardiovascular, pulmonary, gi and gu systems are negative, except as otherwise noted.      Objective    /73   Pulse 67   Temp 97.8  F (36.6  C) (Oral)   Ht 1.67 m (5' 5.75\")   Wt 56.8 kg (125 lb 3.2 oz)   Breastfeeding Yes   BMI 20.36 kg/m    Body mass index is 20.36 kg/m .  Physical Exam   GENERAL: healthy, alert and no distress  ABDOMEN: soft, nontender, no hepatosplenomegaly, no masses and bowel sounds normal   (female): normal female external genitalia, normal urethral meatus, vaginal mucosa, normal cervix/adnexa/uterus without masses or discharge  MS: no gross musculoskeletal defects noted, no edema  PSYCH: mentation appears normal, affect normal/bright                    .  ..    "

## 2022-07-26 LAB
BKR LAB AP GYN ADEQUACY: NORMAL
BKR LAB AP GYN INTERPRETATION: NORMAL
BKR LAB AP HPV REFLEX: NORMAL
BKR LAB AP PREVIOUS ABNL DX: NORMAL
BKR LAB AP PREVIOUS ABNORMAL: NORMAL
PATH REPORT.COMMENTS IMP SPEC: NORMAL
PATH REPORT.COMMENTS IMP SPEC: NORMAL
PATH REPORT.RELEVANT HX SPEC: NORMAL

## 2022-07-28 ENCOUNTER — PATIENT OUTREACH (OUTPATIENT)
Dept: OBGYN | Facility: CLINIC | Age: 37
End: 2022-07-28

## 2022-07-28 DIAGNOSIS — N87.1 DYSPLASIA OF CERVIX, HIGH GRADE CIN 2: Primary | ICD-10-CM

## 2022-09-03 ENCOUNTER — OFFICE VISIT (OUTPATIENT)
Dept: URGENT CARE | Facility: URGENT CARE | Age: 37
End: 2022-09-03
Payer: COMMERCIAL

## 2022-09-03 VITALS
OXYGEN SATURATION: 98 % | HEART RATE: 75 BPM | WEIGHT: 125.31 LBS | SYSTOLIC BLOOD PRESSURE: 124 MMHG | TEMPERATURE: 99.8 F | DIASTOLIC BLOOD PRESSURE: 77 MMHG | BODY MASS INDEX: 20.38 KG/M2

## 2022-09-03 DIAGNOSIS — R07.0 THROAT PAIN: Primary | ICD-10-CM

## 2022-09-03 LAB
DEPRECATED S PYO AG THROAT QL EIA: NEGATIVE
GROUP A STREP BY PCR: NOT DETECTED

## 2022-09-03 PROCEDURE — U0003 INFECTIOUS AGENT DETECTION BY NUCLEIC ACID (DNA OR RNA); SEVERE ACUTE RESPIRATORY SYNDROME CORONAVIRUS 2 (SARS-COV-2) (CORONAVIRUS DISEASE [COVID-19]), AMPLIFIED PROBE TECHNIQUE, MAKING USE OF HIGH THROUGHPUT TECHNOLOGIES AS DESCRIBED BY CMS-2020-01-R: HCPCS

## 2022-09-03 PROCEDURE — 87651 STREP A DNA AMP PROBE: CPT

## 2022-09-03 PROCEDURE — U0005 INFEC AGEN DETEC AMPLI PROBE: HCPCS

## 2022-09-03 PROCEDURE — 99203 OFFICE O/P NEW LOW 30 MIN: CPT | Mod: CS

## 2022-09-03 NOTE — PROGRESS NOTES
Assessment & Plan     Throat pain    - Streptococcus A Rapid Screen w/Reflex to PCR - Clinic Collect  - Symptomatic; Unknown COVID-19 Virus (Coronavirus) by PCR Nose  - Group A Streptococcus PCR Throat Swab    Reassured RST is negative today.  COVID pending.  Continue to isolate until results of COVID test are known.  Continue with symptomatic cares.  Close Follow-up if new or worsening sx prn.    Tamanna Galvez MD   Urgent Care Provider  [facility] ERENDIRA Alves is a 36 year old, presenting for the following health issues:  Pharyngitis (Pt has had a runny nose, sore throat since Monday, eyes watering, mild headache and ear pain, took some at home covid tests which were neg)      HPI     Last COVID test yesterday was negative.  Rhinorrhea and ST since Monday.  Now with HA and ear congestion.  No fever.  No cough.      Review of Systems   Constitutional, HEENT, cardiovascular, pulmonary, GI, , musculoskeletal, neuro, skin, endocrine and psych systems are negative, except as otherwise noted.      Objective    /77 (BP Location: Right arm, Patient Position: Sitting, Cuff Size: Adult Regular)   Pulse 75   Temp 99.8  F (37.7  C) (Oral)   Wt 56.8 kg (125 lb 5 oz)   SpO2 98%   BMI 20.38 kg/m    Body mass index is 20.38 kg/m .  Physical Exam   GENERAL: healthy, alert and no distress  EYES: Eyes grossly normal to inspection, PERRL and conjunctivae and sclerae normal  HENT: normal cephalic/atraumatic, nose and mouth without ulcers or lesions, oropharynx clear and oral mucous membranes moist  NECK: no adenopathy, no asymmetry, masses, or scars and thyroid normal to palpation  MS: no gross musculoskeletal defects noted, no edema  SKIN: no suspicious lesions or rashes  NEURO: Normal strength and tone, mentation intact and speech normal  PSYCH: mentation appears normal, affect normal/bright    Results for orders placed or performed in visit on 09/03/22 (from the past 24  hour(s))   Streptococcus A Rapid Screen w/Reflex to PCR - Clinic Collect    Specimen: Throat; Swab   Result Value Ref Range    Group A Strep antigen Negative Negative                   @[facility]@  @[facility]@

## 2022-09-04 LAB — SARS-COV-2 RNA RESP QL NAA+PROBE: NEGATIVE

## 2022-10-23 ENCOUNTER — HEALTH MAINTENANCE LETTER (OUTPATIENT)
Age: 37
End: 2022-10-23

## 2022-11-18 DIAGNOSIS — Z30.41 ORAL CONTRACEPTIVE PILL SURVEILLANCE: ICD-10-CM

## 2022-11-18 RX ORDER — ACETAMINOPHEN AND CODEINE PHOSPHATE 120; 12 MG/5ML; MG/5ML
0.35 SOLUTION ORAL DAILY
Qty: 84 TABLET | Refills: 0 | Status: SHIPPED | OUTPATIENT
Start: 2022-11-18 | End: 2022-12-30

## 2022-11-18 NOTE — TELEPHONE ENCOUNTER
"Requested Prescriptions   Pending Prescriptions Disp Refills     norethindrone (MICRONOR) 0.35 MG tablet 84 tablet 3     Sig: Take 1 tablet (0.35 mg) by mouth daily       Contraceptives Protocol Passed - 11/18/2022  3:13 PM        Passed - Patient is not a current smoker if age is 35 or older        Passed - Recent (12 mo) or future (30 days) visit within the authorizing provider's specialty     Patient has had an office visit with the authorizing provider or a provider within the authorizing providers department within the previous 12 mos or has a future within next 30 days. See \"Patient Info\" tab in inbasket, or \"Choose Columns\" in Meds & Orders section of the refill encounter.              Passed - Medication is active on med list        Passed - No active pregnancy on record        Passed - No positive pregnancy test in past 12 months           Pt last seen 7/21/2022 for dysplasia of cervix, ISMAEL II    Last prescribed 12/16/2021 for 84 tablets with 3 refills    Prescription approved per [facility] Refill Protocol.    Piper Brock RN on 11/18/2022 at 4:05 PM    "

## 2022-12-30 ENCOUNTER — MYC MEDICAL ADVICE (OUTPATIENT)
Dept: OBGYN | Facility: CLINIC | Age: 37
End: 2022-12-30

## 2022-12-30 DIAGNOSIS — Z30.41 ORAL CONTRACEPTIVE PILL SURVEILLANCE: ICD-10-CM

## 2022-12-30 RX ORDER — ACETAMINOPHEN AND CODEINE PHOSPHATE 120; 12 MG/5ML; MG/5ML
0.35 SOLUTION ORAL DAILY
Qty: 84 TABLET | Refills: 1 | Status: SHIPPED | OUTPATIENT
Start: 2022-12-30

## 2022-12-30 NOTE — TELEPHONE ENCOUNTER
Sent in 6 month supply of BC -patient will be due for annual in July.    Also patient was told to advise you when she got her period.  She had started spotting about a week ago - some bloat, no cramping.    Abigail Kendrick RN

## 2023-04-02 ENCOUNTER — HEALTH MAINTENANCE LETTER (OUTPATIENT)
Age: 38
End: 2023-04-02

## 2023-06-01 ENCOUNTER — OFFICE VISIT (OUTPATIENT)
Dept: OBGYN | Facility: CLINIC | Age: 38
End: 2023-06-01
Payer: COMMERCIAL

## 2023-06-01 VITALS
BODY MASS INDEX: 21.14 KG/M2 | HEART RATE: 101 BPM | TEMPERATURE: 98.2 F | DIASTOLIC BLOOD PRESSURE: 82 MMHG | WEIGHT: 130 LBS | SYSTOLIC BLOOD PRESSURE: 152 MMHG

## 2023-06-01 DIAGNOSIS — Z01.419 ENCOUNTER FOR GYNECOLOGICAL EXAMINATION WITHOUT ABNORMAL FINDING: Primary | ICD-10-CM

## 2023-06-01 DIAGNOSIS — N87.1 DYSPLASIA OF CERVIX, HIGH GRADE CIN 2: ICD-10-CM

## 2023-06-01 DIAGNOSIS — Z30.41 ORAL CONTRACEPTIVE PILL SURVEILLANCE: ICD-10-CM

## 2023-06-01 PROCEDURE — 99395 PREV VISIT EST AGE 18-39: CPT | Performed by: OBSTETRICS & GYNECOLOGY

## 2023-06-01 PROCEDURE — 87624 HPV HI-RISK TYP POOLED RSLT: CPT | Performed by: OBSTETRICS & GYNECOLOGY

## 2023-06-01 PROCEDURE — 88175 CYTOPATH C/V AUTO FLUID REDO: CPT | Performed by: OBSTETRICS & GYNECOLOGY

## 2023-06-01 RX ORDER — DESOGESTREL AND ETHINYL ESTRADIOL 0.15-0.03
1 KIT ORAL DAILY
Qty: 84 TABLET | Refills: 3 | Status: SHIPPED | OUTPATIENT
Start: 2023-06-01

## 2023-06-01 ASSESSMENT — PATIENT HEALTH QUESTIONNAIRE - PHQ9
5. POOR APPETITE OR OVEREATING: NOT AT ALL
SUM OF ALL RESPONSES TO PHQ QUESTIONS 1-9: 0

## 2023-06-01 ASSESSMENT — ANXIETY QUESTIONNAIRES
GAD7 TOTAL SCORE: 0
3. WORRYING TOO MUCH ABOUT DIFFERENT THINGS: NOT AT ALL
6. BECOMING EASILY ANNOYED OR IRRITABLE: NOT AT ALL
5. BEING SO RESTLESS THAT IT IS HARD TO SIT STILL: NOT AT ALL
2. NOT BEING ABLE TO STOP OR CONTROL WORRYING: NOT AT ALL
IF YOU CHECKED OFF ANY PROBLEMS ON THIS QUESTIONNAIRE, HOW DIFFICULT HAVE THESE PROBLEMS MADE IT FOR YOU TO DO YOUR WORK, TAKE CARE OF THINGS AT HOME, OR GET ALONG WITH OTHER PEOPLE: NOT DIFFICULT AT ALL
7. FEELING AFRAID AS IF SOMETHING AWFUL MIGHT HAPPEN: NOT AT ALL
GAD7 TOTAL SCORE: 0
1. FEELING NERVOUS, ANXIOUS, OR ON EDGE: NOT AT ALL

## 2023-06-01 NOTE — PROGRESS NOTES
Joselyn is a 37 year old  female who presents for annual exam.     Menses are irregular due to birth control pills and  lasting  days.  Menses flow: normal.  Patient's last menstrual period was 2023.. Using oral contraceptives for contraception.  She is not currently considering pregnancy.  Besides routine health maintenance, she has no other health concerns today . Would like to switch back to combined OCPs.   GYNECOLOGIC HISTORY:  Menarche: 13  Age at first intercourse: 15 Number of lifetime partners: 10  Joselyn is sexually active with 1male partner(s) and is currently in monogamous relationship .    History sexually transmitted infections:HPV  STI testing offered?  Declined  LINETTE exposure: No  History of abnormal Pap smear: YES - updated in Problem List and Health Maintenance accordingly  Family history of breast CA: Yes (Please explain): no  Family history of uterine/ovarian CA: No    Family history of colon CA: Yes (Please explain): mgmo    HEALTH MAINTENANCE:  Cholesterol: (  Cholesterol   Date Value Ref Range Status   2016 195 <200 mg/dL Final   2011 189 0 - 200 mg/dL Final     Comment:     LDL Cholesterol is the primary guide to therapy.   The NCEP recommends further evaluation of: patients with cholesterol <200   mg/dL   if additional risk factors are present, cholesterol >240 mg/dL, triglycerides   >150 mg/dL, or HDL <40 mg/dL.    History of abnormal lipids: No  Mammo: no . History of abnormal Mammo: Not applicable.  Regular Self Breast Exams: No  Calcium/Vitamin D intake: source:  dairy, dietary supplement(s) Adequate? Yes  TSH: (  TSH   Date Value Ref Range Status   2016 2.80 0.40 - 4.00 mU/L Final    )  Pap; (  Lab Results   Component Value Date    PAP NIL 2021    PAP ASC-US 10/15/2020    PAP ASC-H 2019    )    HISTORY:  OB History    Para Term  AB Living   1 1 1 0 0 1   SAB IAB Ectopic Multiple Live Births   0 0 0 0 1      # Outcome Date GA Lbr  Rolando/2nd Weight Sex Delivery Anes PTL Lv   1 Term 10/05/21 41w0d   M  EPI N ALON      Complications: Third degree perineal laceration      Apgar1: 8  Apgar5: 9     Past Medical History:   Diagnosis Date     Abnormal Pap smear of cervix 2019, 10/15/20     ASCUS on Pap smear 2011     Cervical high risk HPV (human papillomavirus) test positive 2011, 10/15/20, 21     History of colposcopy with cervical biopsy 2011    ISMAEL 1     LSIL (low grade squamous intraepithelial lesion) on Pap smear 2011     Poison ivy dermatitis      Past Surgical History:   Procedure Laterality Date     COLPOSCOPY  2019    see problem list     EYE SURGERY  2015    lasix     Family History   Problem Relation Age of Onset     Pancreatic Cancer Maternal Uncle      Breast Cancer Paternal Aunt      Social History     Socioeconomic History     Marital status:    Occupational History     Employer: [employer]   Tobacco Use     Smoking status: Never     Smokeless tobacco: Never   Vaping Use     Vaping status: Never Used   Substance and Sexual Activity     Alcohol use: Not Currently     Comment: 1 per wk     Drug use: No     Sexual activity: Yes     Partners: Male     Birth control/protection: None   Other Topics Concern     Parent/sibling w/ CABG, MI or angioplasty before 65F 55M? No      Service No     Blood Transfusions No     Caffeine Concern No     Occupational Exposure No     Hobby Hazards No     Sleep Concern No     Stress Concern No     Weight Concern No     Special Diet No     Back Care No     Exercise Yes     Comment: occ     Seat Belt Yes     Self-Exams No     Social Determinants of Health     Stress: No Stress Concern Present (2/3/2021)    [employer] [place] [employer] [facility] - Occupational Stress Questionnaire      Feeling of Stress : Not at all       Current Outpatient Medications:      Calcium Carbonate Antacid (TUMS PO), , Disp: , Rfl:       desogestrel-ethinyl estradiol (APRI) 0.15-30 MG-MCG tablet, Take 1 tablet by mouth daily, Disp: 84 tablet, Rfl: 3     norethindrone (MICRONOR) 0.35 MG tablet, Take 1 tablet (0.35 mg) by mouth daily, Disp: 84 tablet, Rfl: 1     Allergies   Allergen Reactions     Seasonal Allergies        Past medical, surgical, social and family history were reviewed and updated in EPIC.    ROS:   C:     NEGATIVE for fever, chills, change in weight  I:       NEGATIVE for worrisome rashes, moles or lesions  E:     NEGATIVE for vision changes or irritation  E/M: NEGATIVE for ear, mouth and throat problems  R:     NEGATIVE for significant cough or SOB  CV:   NEGATIVE for chest pain, palpitations or peripheral edema  GI:     NEGATIVE for nausea, abdominal pain, heartburn, or change in bowel habits  :   NEGATIVE for frequency, dysuria, hematuria, vaginal discharge, or irregular bleeding  M:     NEGATIVE for significant arthralgias or myalgia  N:      NEGATIVE for weakness, dizziness or paresthesias  E:      NEGATIVE for temperature intolerance, skin/hair changes  P:      NEGATIVE for changes in mood or affect.    EXAM:  BP (!) 152/82   Pulse 101   Temp 98.2  F (36.8  C)   Wt 59 kg (130 lb)   LMP 05/11/2023   Breastfeeding No   BMI 21.14 kg/m     BMI: Body mass index is 21.14 kg/m .  Constitutional: healthy, alert and no distress  Head: Normocephalic. No masses, lesions, tenderness or abnormalities  Neck: Neck supple. Trachea midline. No adenopathy. Thyroid symmetric, normal size.   Cardiovascular: RRR.   Respiratory: Negative.   Breast: Breasts reveal mild symmetric fibrocystic densities, but there are no dominant, discrete, fixed or suspicious masses found.  Gastrointestinal: Abdomen soft, non-tender, non-distended. No masses, organomegaly.  :  Vulva:  No external lesions, normal female hair distribution, no inguinal adenopathy.    Urethra:  Midline, non-tender, well supported, no discharge  Vagina:  Moist, pink, no abnormal  discharge, no lesions  Uterus:  Normal size, anteverted , non-tender, freely mobile  Ovaries:  No masses appreciated, non-tender, mobile  Rectal Exam: deferred  Musculoskeletal: extremities normal  Skin: no suspicious lesions or rashes  Psychiatric: Affect appropriate, cooperative,mentation appears normal.     COUNSELING:   Reviewed preventive health counseling, as reflected in patient instructions   reports that she has never smoked. She has never used smokeless tobacco.    Body mass index is 21.14 kg/m .    FRAX Risk Assessment    ASSESSMENT:  37 year old female with satisfactory annual exam  (Z01.419) Encounter for gynecological examination without abnormal finding  (primary encounter diagnosis)  Comment:   Plan: UTD on HM     (N87.1) Dysplasia of cervix, high grade ISMAEL 2  Comment:   Plan: Pap diagnostic with HPV            (Z30.41) Oral contraceptive pill surveillance  Comment:   Plan: desogestrel-ethinyl estradiol (APRI) 0.15-30         MG-MCG tablet        Stable refills

## 2023-06-08 ENCOUNTER — PATIENT OUTREACH (OUTPATIENT)
Dept: OBGYN | Facility: CLINIC | Age: 38
End: 2023-06-08
Payer: COMMERCIAL

## 2023-08-18 ENCOUNTER — OFFICE VISIT (OUTPATIENT)
Dept: OBGYN | Facility: CLINIC | Age: 38
End: 2023-08-18
Payer: COMMERCIAL

## 2023-08-18 VITALS
WEIGHT: 129.3 LBS | BODY MASS INDEX: 21.03 KG/M2 | SYSTOLIC BLOOD PRESSURE: 112 MMHG | OXYGEN SATURATION: 100 % | HEART RATE: 81 BPM | DIASTOLIC BLOOD PRESSURE: 70 MMHG

## 2023-08-18 DIAGNOSIS — R87.810 CERVICAL HIGH RISK HPV (HUMAN PAPILLOMAVIRUS) TEST POSITIVE: Primary | ICD-10-CM

## 2023-08-18 LAB — HCG UR QL: NEGATIVE

## 2023-08-18 PROCEDURE — 81025 URINE PREGNANCY TEST: CPT | Performed by: OBSTETRICS & GYNECOLOGY

## 2023-08-18 PROCEDURE — 88305 TISSUE EXAM BY PATHOLOGIST: CPT | Performed by: PATHOLOGY

## 2023-08-18 PROCEDURE — 57456 ENDOCERV CURETTAGE W/SCOPE: CPT | Performed by: OBSTETRICS & GYNECOLOGY

## 2023-08-18 NOTE — PROGRESS NOTES
Joselyn Flynn is a 37 year old female  who presents for repeat colposcopy, referred by Dr. Venegas. Pap smear on  showed: with high risk HPV present: non 16/18. The prior pap showed with high risk HPV present:   11 pap ASCUS + HPV 16 (high risk)  11 colposcopy.  Biopsy= ISMAEL I.  Recommend repeat pap in 4-6 months.  11 pap LSIL. Plan-- pap in 6 months. (Due 3/2012) reminder placed.  12 pap-- NIL. Plan--If pap is normal or LGSIL the repeat in 6 months (pap due on or about 12)  12 pap NIL. Plan--Repeat pap at [facility] in 6 months. (due 13)  13 pap NIL. Plan--routine screening   16 NIL pap.   19: ASC-H Pap, + HR HPV (Not 16 or 18) result. Plan [facility].   19 [facility] bx and ECC: WNL. Plan: cotest 1 year  10/15/20: ASCUS Pap, + HR HPV (not 16 or 18). Plan [facility] due bef 01/15/21.   20: [facility] Bx ISMAEL 2, ECC benign. Plan LEEP now or [facility] and cotest in 6 months.   21 [facility] Bx's Neg for dysplasia. NIL Pap, + HR HPV (not 16 or 18),  pregnant pt, Plan cotest postpartum EDC 21.   21 NIL pap, + HR HPV (not 16 or 18).  Plan: cotest in 6 months per provider  22 NIL, +HR HPV, not 16/18. Plan 1 yr co-test  23 NIL pap, + HR HPV (not 16 or 18). Plan: colp bef 23 Left msg and Mychart result note sent.  6/15/23 Pt notified  23 visit               Patient's last menstrual period was 2023 (exact date).  UPT today is negative  Patient does not smoke  Type of contraception: oral contraceptive  Age at first sexual intercourse: 15  Number of sexual partners (lifetime): more than 5  Past GYN history: No STD history and HPV  Prior cervical/vaginal disease: ISMAEL 2.  Prior cervical treatment: no treatment.      PROCEDURE:      Before the procedure, it was ensured that the patient was educated regarding the nature of her findings to date, the implications, and what was to be done. She has been made aware of the role of HPV, the natural  history of infection, ways to minimize her future risk, the effect of HPV on the cervix, and treatment options available should they be indicated. The details of the colposcopic procedure were reviewed. All questions were answered before proceeding, and informed consent was therefore obtained.      Speculum placed in vagina and excellent visualization of cervix acheived, cervix swabbed x 3 with acetic acid solution.      FINDINGS:  Cervix: no visible lesions  Please refer to images section for details.  SCJ seen?: yes   ECC done?: YES  Satisfactory examination?: yes      ASSESSMENT: Normal exam without visible pathology.  PLAN: specimens labelled and sent to Pathology, will base further treatment on Pathology findings, post biopsy instructions given to patient,MyChart to discuss Pathology results, and anticipate Repeat pap smear in 12 months      Kimberly Venegas MD

## 2023-08-22 LAB
PATH REPORT.COMMENTS IMP SPEC: NORMAL
PATH REPORT.COMMENTS IMP SPEC: NORMAL
PATH REPORT.FINAL DX SPEC: NORMAL
PATH REPORT.GROSS SPEC: NORMAL
PATH REPORT.MICROSCOPIC SPEC OTHER STN: NORMAL
PATH REPORT.RELEVANT HX SPEC: NORMAL
PHOTO IMAGE: NORMAL

## 2023-08-25 ENCOUNTER — PATIENT OUTREACH (OUTPATIENT)
Dept: OBGYN | Facility: CLINIC | Age: 38
End: 2023-08-25
Payer: COMMERCIAL

## 2023-08-25 DIAGNOSIS — N87.1 DYSPLASIA OF CERVIX, HIGH GRADE CIN 2: Primary | ICD-10-CM

## 2023-12-12 ENCOUNTER — OFFICE VISIT (OUTPATIENT)
Dept: FAMILY MEDICINE | Facility: CLINIC | Age: 38
End: 2023-12-12
Payer: COMMERCIAL

## 2023-12-12 VITALS
BODY MASS INDEX: 22.57 KG/M2 | HEIGHT: 65 IN | DIASTOLIC BLOOD PRESSURE: 76 MMHG | TEMPERATURE: 98.5 F | HEART RATE: 77 BPM | WEIGHT: 135.5 LBS | RESPIRATION RATE: 16 BRPM | SYSTOLIC BLOOD PRESSURE: 116 MMHG | OXYGEN SATURATION: 100 %

## 2023-12-12 DIAGNOSIS — H69.91 DYSFUNCTION OF RIGHT EUSTACHIAN TUBE: Primary | ICD-10-CM

## 2023-12-12 PROCEDURE — 99213 OFFICE O/P EST LOW 20 MIN: CPT

## 2023-12-12 RX ORDER — ECHINACEA PURPUREA EXTRACT 125 MG
TABLET ORAL
Qty: 30 ML | Refills: 0 | Status: SHIPPED | OUTPATIENT
Start: 2023-12-12

## 2023-12-12 RX ORDER — FLUTICASONE PROPIONATE 50 MCG
1 SPRAY, SUSPENSION (ML) NASAL DAILY
Qty: 9.9 ML | Refills: 0 | Status: SHIPPED | OUTPATIENT
Start: 2023-12-12

## 2023-12-12 ASSESSMENT — PAIN SCALES - GENERAL: PAINLEVEL: NO PAIN (0)

## 2023-12-12 NOTE — PROGRESS NOTES
Assessment & Plan     Dysfunction of right eustachian tube  - fluticasone (FLONASE) 50 MCG/ACT nasal spray; Spray 1 spray into both nostrils daily  - Adult Audiology [facility] Referral; Future  - sodium chloride (OCEAN) 0.65 % nasal spray; 3-6 drops of saline on right nostril with head extended off a flat surface (as needed per day for symptom severity).  Most likely etiology for patient's ear symptom is eustachian tube dysfunction given that it started around the time of her flight and she has been sick with congestion and viral infection x 2 since her flight 6 weeks ago.  Physical exam supports the diagnosis of fluid backup given the dullness of the light reflex.  We discussed that Flonase may take a while to kick in, and nasal saline may also be helpful if symptoms become severe.  Given that her symptoms have a rhythmic quality that is not unrelated to her pulse, pulsatile tinnitus seems much less likely.  She also denies any known tinnitus or dizziness.  Please follow-up in 2 months if symptoms are not starting to improve.    We discussed that no active management is needed for large tonsils with tonsil stones, unless symptoms become bothersome to her daily life frequently, or she has frequent pharyngitis.    Agapito Gonzalez NP  [facility] is a 38 year old, presenting for the following health issues:  Pulsating off and on that started 6 weeks ago. Intermittent. Has sensation of a beat and pressure (doesn't follow the heart beat). Patient has had 2nd cold within the past 6 weeks. Not painful and no tinnitus.  No fevers. Mild chills last night  She has tried ibuprofen without relief.    Tonsil stones that get stuck in the tonsils for the past 4 years. She reports sometimes she will self remove her tonsil stones with a Q-tip.  Ear Problem (Pulsating in right ear)      12/12/2023    12:39 PM   Additional Questions   Roomed by tSoney Oliver  "Problem    History of Present Illness       Reason for visit:  Pulsating in right ear  Symptom onset:  More than a month  Symptoms include:  Pulsating  Symptom intensity:  Mild  Symptom progression:  Staying the same  Had these symptoms before:  No  What makes it worse:  No  What makes it better:  No    She eats 2-3 servings of fruits and vegetables daily.She consumes 3 sweetened beverage(s) daily.She exercises with enough effort to increase her heart rate 60 or more minutes per day.  She exercises with enough effort to increase her heart rate 4 days per week.   She is taking medications regularly.     Review of Systems   HENT:  Positive for ear pain.       Constitutional, HEENT, cardiovascular, pulmonary, gi and gu systems are negative, except as otherwise noted.      Objective    /76 (BP Location: Right arm, Patient Position: Sitting, Cuff Size: Adult Regular)   Pulse 77   Temp 98.5  F (36.9  C) (Temporal)   Resp 16   Ht 1.663 m (5' 5.47\")   Wt 61.5 kg (135 lb 8 oz)   LMP 11/28/2023 (Within Days)   SpO2 100%   BMI 22.22 kg/m    Body mass index is 22.22 kg/m .  Physical Exam   GENERAL: healthy, alert and no distress  HENT: Baseline tonsillar hypertrophy with crypts noted.  No tonsil stones actively noted.  Right ear tympanic membrane is dull with dull light reflex and without injection or bulging.  Left ear tympanic membrane is normal.  No canal erythema  PSYCH: mentation appears normal, affect normal/bright            "

## 2024-07-30 ENCOUNTER — PATIENT OUTREACH (OUTPATIENT)
Dept: OBGYN | Facility: CLINIC | Age: 39
End: 2024-07-30
Payer: COMMERCIAL

## 2024-07-30 DIAGNOSIS — N87.1 DYSPLASIA OF CERVIX, HIGH GRADE CIN 2: Primary | ICD-10-CM

## 2024-08-11 ENCOUNTER — HEALTH MAINTENANCE LETTER (OUTPATIENT)
Age: 39
End: 2024-08-11

## 2024-10-01 NOTE — TELEPHONE ENCOUNTER
FYI to provider - Patient is lost to pap tracking follow-up. Attempts to contact pt have been made per reminder process and there has been no reply and/or no appt scheduled. Contact hx listed below.     4/7/11 pap ASCUS + HPV 16 (high risk)  4/26/11 colposcopy.  Biopsy= ISMAEL I.  Recommend repeat pap in 4-6 months.  9/16/11 pap LSIL. Plan-- pap in 6 months. (Due 3/2012) reminder placed.  5/8/12 pap-- NIL. Plan--If pap is normal or LGSIL the repeat in 6 months (pap due on or about 11/8/12)  12/18/12 pap NIL. Plan--Repeat pap at [facility] in 6 months. (due 6/18/13)  6/18/13 pap NIL. Plan--routine screening   9/6/16 NIL pap.   09/19/19: ASC-H Pap, + HR HPV (Not 16 or 18) result. Plan [facility].   11/21/19 [place] bx and ECC: WNL. Plan: cotest 1 year  10/15/20: ASCUS Pap, + HR HPV (not 16 or 18). Plan [facility] due bef 01/15/21.   11/24/20: [place] Bx ISMAEL 2, ECC benign. Plan LEEP now or [facility] and cotest in 6 months.   4/16/21 [facility] Bx's Neg for dysplasia. NIL Pap, + HR HPV (not 16 or 18),  pregnant pt, Plan cotest postpartum EDC 09/28/21.   12/16/21 NIL pap, + HR HPV (not 16 or 18).  Plan: cotest in 6 months per provider  7/21/22 NIL, +HR HPV, not 16/18. Plan 1 yr co-test  6/1/23 NIL pap, + HR HPV (not 16 or 18). Plan: colp bef 9/1/23 8/18/23 [facility]: ECC, benign. Plan 1 yr co-test / notified  07/30/24 Reminder MyChart   9/5/24 Reminder call. Left ms  10/1/24 Lost to follow-up for pap tracking     Shirin Carey RN BSN, Pap Tracking

## 2024-12-26 NOTE — TELEPHONE ENCOUNTER
11/24/20: [place] Bx ISMAEL 2, ECC benign. Plan LEEP now or [place] and cotest in 6 months. Provider sent the results and recommendations to the pt thru mychart. Pt viewed. Awaiting pt's response.    12/29/20 Pt has decided to schedule LEEP. Extended next outreach to give patient time to schedule    
3

## 2025-04-03 ENCOUNTER — OFFICE VISIT (OUTPATIENT)
Dept: OBGYN | Facility: CLINIC | Age: 40
End: 2025-04-03
Payer: COMMERCIAL

## 2025-04-03 VITALS
HEIGHT: 66 IN | WEIGHT: 135 LBS | BODY MASS INDEX: 21.69 KG/M2 | SYSTOLIC BLOOD PRESSURE: 116 MMHG | HEART RATE: 66 BPM | TEMPERATURE: 97.7 F | DIASTOLIC BLOOD PRESSURE: 71 MMHG

## 2025-04-03 DIAGNOSIS — Z13.1 ENCOUNTER FOR SCREENING EXAMINATION FOR IMPAIRED GLUCOSE REGULATION AND DIABETES MELLITUS: ICD-10-CM

## 2025-04-03 DIAGNOSIS — Z13.220 LIPID SCREENING: ICD-10-CM

## 2025-04-03 DIAGNOSIS — R87.810 CERVICAL HIGH RISK HPV (HUMAN PAPILLOMAVIRUS) TEST POSITIVE: ICD-10-CM

## 2025-04-03 DIAGNOSIS — Z01.419 ENCOUNTER FOR GYNECOLOGICAL EXAMINATION WITHOUT ABNORMAL FINDING: Primary | ICD-10-CM

## 2025-04-03 PROBLEM — Z36.89 ENCOUNTER FOR TRIAGE IN PREGNANT PATIENT: Status: RESOLVED | Noted: 2021-08-18 | Resolved: 2025-04-03

## 2025-04-03 PROBLEM — K35.30 ACUTE APPENDICITIS WITH LOCALIZED PERITONITIS, WITHOUT PERFORATION, ABSCESS, OR GANGRENE: Status: ACTIVE | Noted: 2023-03-19

## 2025-04-03 PROBLEM — O99.820 GBS (GROUP B STREPTOCOCCUS CARRIER), +RV CULTURE, CURRENTLY PREGNANT: Status: RESOLVED | Noted: 2021-09-11 | Resolved: 2025-04-03

## 2025-04-03 LAB
CHOLEST SERPL-MCNC: 231 MG/DL
EST. AVERAGE GLUCOSE BLD GHB EST-MCNC: 97 MG/DL
FASTING STATUS PATIENT QL REPORTED: YES
HBA1C MFR BLD: 5 % (ref 0–5.6)
HDLC SERPL-MCNC: 57 MG/DL
LDLC SERPL CALC-MCNC: 162 MG/DL
NONHDLC SERPL-MCNC: 174 MG/DL
TRIGL SERPL-MCNC: 59 MG/DL

## 2025-04-03 RX ORDER — MULTIVITAMIN
1 TABLET ORAL DAILY
COMMUNITY

## 2025-04-03 NOTE — PROGRESS NOTES
Joselyn is a 39 year old  female who presents for annual exam.     Menses are regular q 28-30 days and normal lasting 4 days.  Menses flow: light.  Patient's last menstrual period was 2025.. Using none for contraception.  She is not currently considering pregnancy.  Besides routine health maintenance, she has no other health concerns today .  Stopped taking birth control. Using condoms.   Just got back from a trip to [place] and had a great time.   GYNECOLOGIC HISTORY:  Menarche: 13  Age at first intercourse: 15 Number of lifetime partners: 10  Joselyn is sexually active with 1male partner(s) and is currently in monogamous relationship.    History sexually transmitted infections:HPV  STI testing offered?  Declined  LINETTE exposure: No  History of abnormal Pap smear: YES - reflected in Problem List and Health Maintenance accordingly  Family history of breast CA: No  Family history of uterine/ovarian CA: No    Family history of colon CA: Yes (Please explain): mgmo    HEALTH MAINTENANCE:  Cholesterol: (  Cholesterol   Date Value Ref Range Status   2016 195 <200 mg/dL Final   2011 189 0 - 200 mg/dL Final     Comment:     LDL Cholesterol is the primary guide to therapy.   The NCEP recommends further evaluation of: patients with cholesterol <200   mg/dL   if additional risk factors are present, cholesterol >240 mg/dL, triglycerides   >150 mg/dL, or HDL <40 mg/dL.    History of abnormal lipids: No  Mammo: no . History of abnormal Mammo: No.  Regular Self Breast Exams: No  Calcium/Vitamin D intake: source:  dairy, veggies Adequate? Yes  TSH: (  TSH   Date Value Ref Range Status   2016 2.80 0.40 - 4.00 mU/L Final    )  Pap; (  Lab Results   Component Value Date    PAP NIL 2021    PAP ASC-US 10/15/2020    PAP ASC-H 2019    )    HISTORY:  OB History    Para Term  AB Living   1 1 1 0 0 1   SAB IAB Ectopic Multiple Live Births   0 0 0 0 1      # Outcome Date GA Lbr Rolando/2nd  Weight Sex Type Anes PTL Lv   1 Term 10/05/21 41w0d   M  EPI N ALON      Complications: Third degree perineal laceration      Apgar1: 8  Apgar5: 9     Past Medical History:   Diagnosis Date    Abnormal Pap smear of cervix 2019, 10/15/20    ASCUS on Pap smear 2011    Cervical high risk HPV (human papillomavirus) test positive 2011, 10/15/20, 21    History of colposcopy with cervical biopsy 2011    ISMAEL 1    LSIL (low grade squamous intraepithelial lesion) on Pap smear 2011    Poison ivy dermatitis      Past Surgical History:   Procedure Laterality Date    COLPOSCOPY  2019    see problem list    EYE SURGERY  2015    lasix     Family History   Problem Relation Age of Onset    Pancreatic Cancer Maternal Uncle     Breast Cancer Paternal Aunt      Social History     Socioeconomic History    Marital status:    Occupational History     Employer: [employer]   Tobacco Use    Smoking status: Never    Smokeless tobacco: Never   Vaping Use    Vaping status: Never Used   Substance and Sexual Activity    Alcohol use: Not Currently     Comment: 1 per wk    Drug use: No    Sexual activity: Yes     Partners: Male     Birth control/protection: None   Other Topics Concern    Parent/sibling w/ CABG, MI or angioplasty before 65F 55M? No     Service No    Blood Transfusions No    Caffeine Concern No    Occupational Exposure No    Hobby Hazards No    Sleep Concern No    Stress Concern No    Weight Concern No    Special Diet No    Back Care No    Exercise Yes     Comment: occ    Seat Belt Yes    Self-Exams No     Social Drivers of Health     Financial Resource Strain: Low Risk  (2023)    Financial Resource Strain     Within the past 12 months, have you or your family members you live with been unable to get utilities (heat, electricity) when it was really needed?: No   Food Insecurity: Low Risk  (2023)    Food Insecurity     Within  the past 12 months, did you worry that your food would run out before you got money to buy more?: No     Within the past 12 months, did the food you bought just not last and you didn t have money to get more?: No   Transportation Needs: Low Risk  (12/12/2023)    Transportation Needs     Within the past 12 months, has lack of transportation kept you from medical appointments, getting your medicines, non-medical meetings or appointments, work, or from getting things that you need?: No   Stress: No Stress Concern Present (2/3/2021)    [facility] [place] [facility] - Occupational Stress Questionnaire     Feeling of Stress : Not at all   Interpersonal Safety: Low Risk  (12/12/2023)    Interpersonal Safety     Do you feel physically and emotionally safe where you currently live?: Yes     Within the past 12 months, have you been hit, slapped, kicked or otherwise physically hurt by someone?: No     Within the past 12 months, have you been humiliated or emotionally abused in other ways by your partner or ex-partner?: No   Housing Stability: Low Risk  (12/12/2023)    Housing Stability     Do you have housing? : Yes     Are you worried about losing your housing?: No       Current Outpatient Medications:     multivitamin w/minerals (MULTI-VITAMIN) tablet, Take 1 tablet by mouth daily., Disp: , Rfl:     Calcium Carbonate Antacid (TUMS PO), , Disp: , Rfl:     desogestrel-ethinyl estradiol (APRI) 0.15-30 MG-MCG tablet, Take 1 tablet by mouth daily (Patient not taking: Reported on 4/3/2025), Disp: 84 tablet, Rfl: 3    fluticasone (FLONASE) 50 MCG/ACT nasal spray, Spray 1 spray into both nostrils daily (Patient not taking: Reported on 4/3/2025), Disp: 9.9 mL, Rfl: 0    norethindrone (MICRONOR) 0.35 MG tablet, Take 1 tablet (0.35 mg) by mouth daily (Patient not taking: Reported on 4/3/2025), Disp: 84 tablet, Rfl: 1    sodium chloride (OCEAN) 0.65 % nasal spray, 3-6 drops of saline on right nostril with head extended off a  "flat surface (as needed per day for symptom severity). (Patient not taking: Reported on 4/3/2025), Disp: 30 mL, Rfl: 0     Allergies   Allergen Reactions    Seasonal Allergies        Past medical, surgical, social and family history were reviewed and updated in EPIC.    ROS:   C:     NEGATIVE for fever, chills, change in weight  I:       NEGATIVE for worrisome rashes, moles or lesions  E:     NEGATIVE for vision changes or irritation  E/M: NEGATIVE for ear, mouth and throat problems  R:     NEGATIVE for significant cough or SOB  CV:   NEGATIVE for chest pain, palpitations or peripheral edema  GI:     NEGATIVE for nausea, abdominal pain, heartburn, or change in bowel habits  :   NEGATIVE for frequency, dysuria, hematuria, vaginal discharge, or irregular bleeding  M:     NEGATIVE for significant arthralgias or myalgia  N:      NEGATIVE for weakness, dizziness or paresthesias  E:      NEGATIVE for temperature intolerance, skin/hair changes  P:      NEGATIVE for changes in mood or affect.    EXAM:  /71   Pulse 66   Temp 97.7  F (36.5  C)   Ht 1.676 m (5' 6\")   Wt 61.2 kg (135 lb)   LMP 03/28/2025   BMI 21.79 kg/m     BMI: Body mass index is 21.79 kg/m .  Constitutional: healthy, alert and no distress  Head: Normocephalic. No masses, lesions, tenderness or abnormalities  Neck: Neck supple. Trachea midline. No adenopathy. Thyroid symmetric, normal size.   Cardiovascular: RRR.   Respiratory: Negative.   Breast: Breasts reveal mild symmetric fibrocystic densities, but there are no dominant, discrete, fixed or suspicious masses found.  Gastrointestinal: Abdomen soft, non-tender, non-distended. No masses, organomegaly.  :  Vulva:  No external lesions, normal female hair distribution, no inguinal adenopathy.    Urethra:  Midline, non-tender, well supported, no discharge  Vagina:  Moist, pink, no abnormal discharge, no lesions  Uterus:  Normal size, anteverted , non-tender, freely mobile  Ovaries:  No masses " appreciated, non-tender, mobile  Rectal Exam: deferred  Musculoskeletal: extremities normal  Skin: no suspicious lesions or rashes  Psychiatric: Affect appropriate, cooperative,mentation appears normal.     COUNSELING:   Reviewed preventive health counseling, as reflected in patient instructions       Regular exercise       Healthy diet/nutrition       Contraception   reports that she has never smoked. She has never used smokeless tobacco.    Body mass index is 21.79 kg/m .    FRAX Risk Assessment    ASSESSMENT:  39 year old female with satisfactory annual exam  (Z01.419) Encounter for gynecological examination without abnormal finding  (primary encounter diagnosis)  Comment:   Plan: UTD on HM     (Z13.220) Lipid screening  Comment:   Plan: Lipid Profile (Chol, Trig, HDL, LDL calc)            (Z13.1) Encounter for screening examination for impaired glucose regulation and diabetes mellitus  Comment:   Plan: Hemoglobin A1c            (R87.810) Cervical high risk HPV (human papillomavirus) test positive  Comment:   Plan: HPV and Gynecologic Cytology Panel

## 2025-04-08 LAB
HPV HR 12 DNA CVX QL NAA+PROBE: POSITIVE
HPV16 DNA CVX QL NAA+PROBE: NEGATIVE
HPV18 DNA CVX QL NAA+PROBE: NEGATIVE
HUMAN PAPILLOMA VIRUS FINAL DIAGNOSIS: ABNORMAL

## 2025-04-09 LAB
BKR AP ASSOCIATED HPV REPORT: NORMAL
BKR LAB AP GYN ADEQUACY: NORMAL
BKR LAB AP GYN INTERPRETATION: NORMAL
BKR LAB AP LMP: NORMAL
BKR LAB AP PREVIOUS ABNL DX: NORMAL
BKR LAB AP PREVIOUS ABNORMAL: NORMAL
PATH REPORT.COMMENTS IMP SPEC: NORMAL
PATH REPORT.COMMENTS IMP SPEC: NORMAL
PATH REPORT.RELEVANT HX SPEC: NORMAL

## 2025-04-10 ENCOUNTER — PATIENT OUTREACH (OUTPATIENT)
Dept: OBGYN | Facility: CLINIC | Age: 40
End: 2025-04-10
Payer: COMMERCIAL

## 2025-04-10 DIAGNOSIS — N87.1 DYSPLASIA OF CERVIX, HIGH GRADE CIN 2: Primary | ICD-10-CM
